# Patient Record
Sex: MALE | Race: WHITE | NOT HISPANIC OR LATINO | Employment: STUDENT | URBAN - METROPOLITAN AREA
[De-identification: names, ages, dates, MRNs, and addresses within clinical notes are randomized per-mention and may not be internally consistent; named-entity substitution may affect disease eponyms.]

---
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
## 2022-01-31 ENCOUNTER — OFFICE VISIT (OUTPATIENT)
Dept: INTERNAL MEDICINE | Facility: CLINIC | Age: 22
End: 2022-01-31
Payer: OTHER GOVERNMENT

## 2022-01-31 VITALS
TEMPERATURE: 98 F | BODY MASS INDEX: 20.12 KG/M2 | SYSTOLIC BLOOD PRESSURE: 100 MMHG | WEIGHT: 148.56 LBS | DIASTOLIC BLOOD PRESSURE: 60 MMHG | HEART RATE: 60 BPM | HEIGHT: 72 IN

## 2022-01-31 DIAGNOSIS — F41.1 GAD (GENERALIZED ANXIETY DISORDER): Primary | ICD-10-CM

## 2022-01-31 PROCEDURE — 99999 PR PBB SHADOW E&M-EST. PATIENT-LVL III: CPT | Mod: PBBFAC,,, | Performed by: INTERNAL MEDICINE

## 2022-01-31 PROCEDURE — 99213 OFFICE O/P EST LOW 20 MIN: CPT | Mod: PBBFAC,PO | Performed by: INTERNAL MEDICINE

## 2022-01-31 PROCEDURE — 99213 PR OFFICE/OUTPT VISIT, EST, LEVL III, 20-29 MIN: ICD-10-PCS | Mod: S$PBB,,, | Performed by: INTERNAL MEDICINE

## 2022-01-31 PROCEDURE — 99999 PR PBB SHADOW E&M-EST. PATIENT-LVL III: ICD-10-PCS | Mod: PBBFAC,,, | Performed by: INTERNAL MEDICINE

## 2022-01-31 PROCEDURE — 99213 OFFICE O/P EST LOW 20 MIN: CPT | Mod: S$PBB,,, | Performed by: INTERNAL MEDICINE

## 2022-01-31 RX ORDER — BUSPIRONE HYDROCHLORIDE 10 MG/1
10 TABLET ORAL 3 TIMES DAILY
Qty: 90 TABLET | Refills: 3 | Status: SHIPPED | OUTPATIENT
Start: 2022-01-31 | End: 2022-10-17 | Stop reason: SDUPTHER

## 2022-01-31 NOTE — PROGRESS NOTES
Subjective:       Patient ID: Dameon Martinez is a 21 y.o. male.    Chief Complaint: Follow-up    HPI Patient is a 21-year-old male presenting today following up on his anxiety disorder.  He indicates he has been doing well.  He has been taking the BuSpar and Lexapro without any significant issues.  He states when he got out of school for the holiday break he stop taking the abuse mcnally he felt he did need it while he was in school but now he is back in school he is getting back on the medication.  He had a good response to the combo therapy and has done well generally with it.  He did not have any adverse effects.  He has been happy with the treatment.    Review of Systems    Objective:   /60   Pulse 60   Temp 97.9 °F (36.6 °C) (Temporal)   Ht 6' (1.829 m)   Wt 67.4 kg (148 lb 9.4 oz)   BMI 20.15 kg/m²      Physical Exam  Vitals reviewed.   Constitutional:       Appearance: He is well-developed.   HENT:      Head: Normocephalic and atraumatic.      Right Ear: Tympanic membrane, ear canal and external ear normal.      Left Ear: Tympanic membrane, ear canal and external ear normal.   Eyes:      Pupils: Pupils are equal, round, and reactive to light.   Neck:      Thyroid: No thyromegaly.   Cardiovascular:      Rate and Rhythm: Normal rate and regular rhythm.      Heart sounds: Normal heart sounds. No murmur heard.  No friction rub. No gallop.    Pulmonary:      Effort: Pulmonary effort is normal.      Breath sounds: Normal breath sounds. No wheezing or rales.   Abdominal:      General: Bowel sounds are normal. There is no distension.      Palpations: Abdomen is soft.      Tenderness: There is no abdominal tenderness.   Musculoskeletal:      Cervical back: Neck supple.   Psychiatric:         Mood and Affect: Mood normal.             Assessment:       1. YASIR (generalized anxiety disorder)        Plan:   No problem-specific Assessment & Plan notes found for this encounter.    YASIR (generalized anxiety  disorder)  Comments:  Continue current regimen, stable.  Good response to buspar and lexaprol  Orders:  -     busPIRone (BUSPAR) 10 MG tablet; Take 1 tablet (10 mg total) by mouth 3 (three) times daily.  Dispense: 90 tablet; Refill: 3          Follow up in about 6 months (around 7/31/2022) for Virtual Visit.

## 2022-02-21 ENCOUNTER — OFFICE VISIT (OUTPATIENT)
Dept: URGENT CARE | Facility: CLINIC | Age: 22
End: 2022-02-21
Payer: OTHER GOVERNMENT

## 2022-02-21 VITALS
DIASTOLIC BLOOD PRESSURE: 64 MMHG | TEMPERATURE: 98 F | HEIGHT: 72 IN | SYSTOLIC BLOOD PRESSURE: 128 MMHG | RESPIRATION RATE: 20 BRPM | WEIGHT: 150 LBS | BODY MASS INDEX: 20.32 KG/M2 | OXYGEN SATURATION: 100 % | HEART RATE: 65 BPM

## 2022-02-21 DIAGNOSIS — R05.9 COUGH: ICD-10-CM

## 2022-02-21 DIAGNOSIS — J06.9 VIRAL URI WITH COUGH: Primary | ICD-10-CM

## 2022-02-21 DIAGNOSIS — F41.1 GAD (GENERALIZED ANXIETY DISORDER): ICD-10-CM

## 2022-02-21 LAB
CTP QC/QA: YES
SARS-COV-2 RDRP RESP QL NAA+PROBE: NEGATIVE

## 2022-02-21 PROCEDURE — 99213 OFFICE O/P EST LOW 20 MIN: CPT | Mod: S$GLB,,, | Performed by: PHYSICIAN ASSISTANT

## 2022-02-21 PROCEDURE — 99213 PR OFFICE/OUTPT VISIT, EST, LEVL III, 20-29 MIN: ICD-10-PCS | Mod: S$GLB,,, | Performed by: PHYSICIAN ASSISTANT

## 2022-02-21 PROCEDURE — U0002 COVID-19 LAB TEST NON-CDC: HCPCS | Mod: QW,S$GLB,, | Performed by: PHYSICIAN ASSISTANT

## 2022-02-21 PROCEDURE — U0002: ICD-10-PCS | Mod: QW,S$GLB,, | Performed by: PHYSICIAN ASSISTANT

## 2022-02-21 RX ORDER — ESCITALOPRAM OXALATE 20 MG/1
20 TABLET ORAL DAILY
Qty: 30 TABLET | Refills: 11 | Status: SHIPPED | OUTPATIENT
Start: 2022-02-21 | End: 2022-07-01 | Stop reason: SDUPTHER

## 2022-02-21 NOTE — TELEPHONE ENCOUNTER
No new care gaps identified.  Powered by [employer]. Reference number: [id number].   2/21/2022 1:09:12 PM CST

## 2022-02-21 NOTE — PROGRESS NOTES
Subjective:       Patient ID: Dameon Martinez is a 21 y.o. male.    Vitals:  height is 6' (1.829 m) and weight is 68 kg (150 lb). His temperature is 97.8 °F (36.6 °C). His blood pressure is 128/64 and his pulse is 65. His respiration is 20 and oxygen saturation is 100%.     Chief Complaint: Cough    22 yo male presents to the clinic with c/o intermittent productive cough, sneezing, congestion, runny nose, post nasal drip and intermittent mild body aches x 4 days.  He has been treating symptoms with Tylenol.  His roommate has been dealing with similar symptoms.  Patient states he has a mild throat discomfort and he denies any current headache which have been occasional and mild in the past week.  Denies any fever, chills, new exposures, allergies, or hx of asthma.  Pt is allergic to PCN.     Cough  This is a new problem. The current episode started in the past 7 days. The problem has been unchanged. The problem occurs constantly. The cough is non-productive. Associated symptoms include headaches (none at present), nasal congestion, postnasal drip and a sore throat (mild discomfort). Pertinent negatives include no chest pain, chills, ear congestion, ear pain, eye redness, fever, hemoptysis, myalgias, rash, rhinorrhea, shortness of breath, sweats, weight loss or wheezing. Treatments tried: tylenol. The treatment provided mild relief. His past medical history is significant for asthma, bronchiectasis and emphysema. There is no history of bronchitis, COPD, environmental allergies or pneumonia.   URI   Associated symptoms include congestion, coughing (productive), headaches (none at present), sneezing and a sore throat (mild discomfort). Pertinent negatives include no chest pain, ear pain, nausea, rash, rhinorrhea, sinus pain, vomiting or wheezing.       Constitution: Negative for chills and fever.   HENT: Positive for congestion, postnasal drip and sore throat (mild discomfort). Negative for ear pain, ear discharge,  sinus pain, sinus pressure and trouble swallowing.    Neck: Negative for painful lymph nodes.   Cardiovascular: Negative for chest pain.   Eyes: Negative for eye discharge, eye itching, eye pain, eye redness and photophobia.   Respiratory: Positive for cough (productive). Negative for bloody sputum, shortness of breath, wheezing and asthma.    Gastrointestinal: Negative for nausea and vomiting.   Musculoskeletal: Negative for muscle ache.   Skin: Negative for rash.   Allergic/Immunologic: Positive for sneezing. Negative for environmental allergies and asthma.   Neurological: Positive for headaches (none at present).   Hematologic/Lymphatic: Negative for swollen lymph nodes.   Psychiatric/Behavioral: Negative for confusion.       Objective:      Physical Exam   Constitutional: He is oriented to person, place, and time. He is cooperative.  Non-toxic appearance. He does not appear ill. No distress. normal  HENT:   Head: Normocephalic and atraumatic.   Ears:   Right Ear: Hearing, tympanic membrane, external ear and ear canal normal. No drainage or tenderness. Tympanic membrane is not bulging. impacted cerumen  Left Ear: Hearing, tympanic membrane, external ear and ear canal normal. No drainage or tenderness. Tympanic membrane is not bulging. impacted cerumen  Nose: Congestion present. No rhinorrhea, purulent discharge or sinus tenderness. No epistaxis.   Mouth/Throat: Uvula is midline, oropharynx is clear and moist and mucous membranes are normal. Mucous membranes are moist. Mucous membranes are not pale. He does not have dentures. No trismus in the jaw. No uvula swelling. No oropharyngeal exudate, posterior oropharyngeal erythema or tonsillar abscesses. Tonsils are 2+ on the right. Tonsils are 2+ on the left. No tonsillar exudate. Oropharynx is clear.   Eyes: Conjunctivae and lids are normal. Pupils are equal, round, and reactive to light. Right eye exhibits no discharge. Left eye exhibits no discharge. No scleral  icterus.   Neck: Neck supple.   Cardiovascular: Normal rate, regular rhythm, normal heart sounds and normal pulses.   No murmur heard.  Pulmonary/Chest: Effort normal and breath sounds normal. No stridor. No respiratory distress. He has no wheezes. He has no rhonchi. He has no rales.   Lymphadenopathy:     He has no cervical adenopathy.   Neurological: no focal deficit. He is alert and oriented to person, place, and time.   Skin: Skin is warm, dry, not diaphoretic and not pale. Capillary refill takes less than 2 seconds. jaundice  Psychiatric: His behavior is normal. Mood normal.   Nursing note and vitals reviewed.        Assessment:       1. Viral URI with cough    2. Cough          Plan:         Viral URI with cough    Cough  -     POCT COVID-19 Rapid Screening         COVID test is negative.  Take Tylenol/Ibuprofen as needed for body aches and headache  Practice good hand washing  Add daily antihistamine such as Zyrtex or Claritin  Use OTC flonase 2 pumps in each nostril to relieve congestion  Try lozanges for throat pain   Follow up with primary care physician if symptoms persist in 1 week.  Return to clinic with new or worsening symptoms.        No

## 2022-02-21 NOTE — LETTER
February 21, 2022      [facility]  [address]   [place] [address]  Phone: [phone]       Patient: Dameon Martinez   YOB: 2000  Date of Visit: 02/21/2022    To Whom It May Concern:    Mary Martinez  was at [facility] on 02/21/2022. The patient may return to work on 2/22/2022 with no restrictions. If you have any questions or concerns, or if I can be of further assistance, please do not hesitate to contact me.    Sincerely,    Víctor Munoz PA-C

## 2022-02-24 ENCOUNTER — TELEPHONE (OUTPATIENT)
Dept: URGENT CARE | Facility: CLINIC | Age: 22
End: 2022-02-24
Payer: OTHER GOVERNMENT

## 2022-03-22 ENCOUNTER — PATIENT MESSAGE (OUTPATIENT)
Dept: INTERNAL MEDICINE | Facility: CLINIC | Age: 22
End: 2022-03-22
Payer: OTHER GOVERNMENT

## 2022-03-22 DIAGNOSIS — Z20.828 EXPOSURE TO MONONUCLEOSIS SYNDROME: Primary | ICD-10-CM

## 2022-03-23 ENCOUNTER — PATIENT MESSAGE (OUTPATIENT)
Dept: RESEARCH | Facility: HOSPITAL | Age: 22
End: 2022-03-23
Payer: OTHER GOVERNMENT

## 2022-03-23 ENCOUNTER — LAB VISIT (OUTPATIENT)
Dept: LAB | Facility: HOSPITAL | Age: 22
End: 2022-03-23
Attending: INTERNAL MEDICINE
Payer: OTHER GOVERNMENT

## 2022-03-23 DIAGNOSIS — Z20.828 EXPOSURE TO MONONUCLEOSIS SYNDROME: ICD-10-CM

## 2022-03-23 LAB — HETEROPH AB SERPL QL IA: NEGATIVE

## 2022-03-23 PROCEDURE — 86308 HETEROPHILE ANTIBODY SCREEN: CPT | Performed by: INTERNAL MEDICINE

## 2022-03-23 PROCEDURE — 36415 COLL VENOUS BLD VENIPUNCTURE: CPT | Mod: PO | Performed by: INTERNAL MEDICINE

## 2022-03-24 ENCOUNTER — OFFICE VISIT (OUTPATIENT)
Dept: URGENT CARE | Facility: CLINIC | Age: 22
End: 2022-03-24
Payer: OTHER GOVERNMENT

## 2022-03-24 VITALS
DIASTOLIC BLOOD PRESSURE: 59 MMHG | SYSTOLIC BLOOD PRESSURE: 117 MMHG | WEIGHT: 150 LBS | HEIGHT: 72 IN | BODY MASS INDEX: 20.32 KG/M2 | HEART RATE: 68 BPM | OXYGEN SATURATION: 98 % | TEMPERATURE: 99 F | RESPIRATION RATE: 18 BRPM

## 2022-03-24 DIAGNOSIS — Z20.828 EXPOSURE TO MONONUCLEOSIS SYNDROME: ICD-10-CM

## 2022-03-24 DIAGNOSIS — J02.9 PHARYNGITIS, UNSPECIFIED ETIOLOGY: ICD-10-CM

## 2022-03-24 DIAGNOSIS — J02.9 SORE THROAT: Primary | ICD-10-CM

## 2022-03-24 LAB
CTP QC/QA: YES
CTP QC/QA: YES
HETEROPH AB SER QL: NEGATIVE
MOLECULAR STREP A: NEGATIVE

## 2022-03-24 PROCEDURE — 86665 EPSTEIN-BARR CAPSID VCA: CPT | Performed by: INTERNAL MEDICINE

## 2022-03-24 PROCEDURE — 99214 OFFICE O/P EST MOD 30 MIN: CPT | Mod: S$GLB,,, | Performed by: INTERNAL MEDICINE

## 2022-03-24 PROCEDURE — 99214 PR OFFICE/OUTPT VISIT, EST, LEVL IV, 30-39 MIN: ICD-10-PCS | Mod: S$GLB,,, | Performed by: INTERNAL MEDICINE

## 2022-03-24 PROCEDURE — 86665 EPSTEIN-BARR CAPSID VCA: CPT | Mod: 59 | Performed by: INTERNAL MEDICINE

## 2022-03-24 PROCEDURE — 86308 POCT INFECTIOUS MONONUCLEOSIS: ICD-10-PCS | Mod: QW,S$GLB,, | Performed by: INTERNAL MEDICINE

## 2022-03-24 PROCEDURE — 87651 STREP A DNA AMP PROBE: CPT | Mod: QW,S$GLB,, | Performed by: INTERNAL MEDICINE

## 2022-03-24 PROCEDURE — 87651 POCT STREP A MOLECULAR: ICD-10-PCS | Mod: QW,S$GLB,, | Performed by: INTERNAL MEDICINE

## 2022-03-24 PROCEDURE — 86308 HETEROPHILE ANTIBODY SCREEN: CPT | Mod: QW,S$GLB,, | Performed by: INTERNAL MEDICINE

## 2022-03-24 RX ORDER — PREDNISONE 20 MG/1
40 TABLET ORAL
Status: COMPLETED | OUTPATIENT
Start: 2022-03-24 | End: 2022-03-24

## 2022-03-24 RX ADMIN — PREDNISONE 40 MG: 20 TABLET ORAL at 07:03

## 2022-03-24 NOTE — PROGRESS NOTES
Subjective:       Patient ID: Dameon Martinez is a 21 y.o. male.    Vitals:  height is 6' (1.829 m) and weight is 68 kg (150 lb). His oral temperature is 98.5 °F (36.9 °C). His blood pressure is 117/59 (abnormal) and his pulse is 68. His respiration is 18 and oxygen saturation is 98%.     Chief Complaint: Sore Throat    Pt presents to the clinic today with sore throat that began about a week and a half ago. Pt states that he has been exposed to mono.    Sore Throat   This is a new problem. The current episode started 1 to 4 weeks ago (one and a half weeks). The problem has been gradually worsening. There has been no fever. The pain is at a severity of 6/10. The pain is moderate. Pertinent negatives include no congestion, coughing or headaches. He has had exposure to mono. He has had no exposure to strep. He has tried NSAIDs for the symptoms. The treatment provided mild relief.       HENT: Positive for sore throat. Negative for congestion.    Respiratory: Negative for cough.    Skin: Negative for erythema.   Neurological: Negative for headaches.       Objective:      Physical Exam   Constitutional: He is oriented to person, place, and time. He appears well-developed. No distress.   HENT:   Head: Normocephalic and atraumatic.   Ears:   Right Ear: External ear normal.   Left Ear: External ear normal.   Nose: Nose normal.   Mouth/Throat: Posterior oropharyngeal erythema present. No oropharyngeal exudate.      Comments: Cervical LAD  Eyes: Conjunctivae and EOM are normal. Pupils are equal, round, and reactive to light. Right eye exhibits no discharge. Left eye exhibits no discharge. No scleral icterus.   Neck: Neck supple.   Cardiovascular: Normal rate, regular rhythm and normal heart sounds.   No murmur heard.Exam reveals no gallop and no friction rub.   Pulmonary/Chest: Effort normal. No respiratory distress. He has no wheezes. He has no rales.   Abdominal: Bowel sounds are normal. He exhibits no distension. Soft.    Lymphadenopathy:     He has no cervical adenopathy.   Neurological: He is alert and oriented to person, place, and time.   Skin: Skin is warm, dry, not diaphoretic and no rash. Capillary refill takes less than 2 seconds. No erythema   Psychiatric: His behavior is normal.   Nursing note and vitals reviewed.        Assessment:       1. Sore throat    2. Exposure to mononucleosis syndrome    3. Pharyngitis, unspecified etiology          Plan:         Sore throat  -     POCT Infectious mononucleosis antibody  -     POCT Strep A, Molecular  -     predniSONE tablet 40 mg  -     (Magic mouthwash) 1:1:1 diphenhydramine(Benadryl) 12.5mg/5ml liq, aluminum & magnesium hydroxide-simethicone (Maalox), LIDOcaine viscous 2%; Swish and spit 10 mLs every 4 (four) hours as needed. for mouth sores  Dispense: 360 mL; Refill: 0    Exposure to mononucleosis syndrome  -     Isai-Barr Virus (EBV) Antibodies to VCA, IgM  -     Isai-Barr Virus (EBV) Antibodies to VCA, IgG    Pharyngitis, unspecified etiology  -     predniSONE tablet 40 mg  -     (Magic mouthwash) 1:1:1 diphenhydramine(Benadryl) 12.5mg/5ml liq, aluminum & magnesium hydroxide-simethicone (Maalox), LIDOcaine viscous 2%; Swish and spit 10 mLs every 4 (four) hours as needed. for mouth sores  Dispense: 360 mL; Refill: 0

## 2022-03-24 NOTE — LETTER
March 24, 2022      [facility]  [address]   [place] [address]  Phone: [phone]       Patient: Dameon Martinez   YOB: 2000  Date of Visit: 03/24/2022    To Whom It May Concern:    Mary Martinez  was at [facility] on 03/24/2022. The patient may return to work/school on 3/28/2022  with no restrictions provided he has improved symptoms and no fever for 24 hours. If you have any questions or concerns, or if I can be of further assistance, please do not hesitate to contact me.    Sincerely,    Nhan Du MD

## 2022-03-27 ENCOUNTER — TELEPHONE (OUTPATIENT)
Dept: URGENT CARE | Facility: CLINIC | Age: 22
End: 2022-03-27
Payer: OTHER GOVERNMENT

## 2022-03-28 LAB
EBV VCA IGG SER QL IA: POSITIVE
EBV VCA IGM SER QL IA: NEGATIVE

## 2022-03-29 ENCOUNTER — TELEPHONE (OUTPATIENT)
Dept: URGENT CARE | Facility: CLINIC | Age: 22
End: 2022-03-29
Payer: OTHER GOVERNMENT

## 2022-03-29 NOTE — TELEPHONE ENCOUNTER
Attempted to contact patient to discuss recent test results. BLANCA. Per chart review, pt was contacted for courtesy call and was feeling better.

## 2022-03-29 NOTE — TELEPHONE ENCOUNTER
Pt called back clinic. Discussed + EBV IgG. Pt still having some sore throat and fatigue, but slightly better from visit. Using magic mouthwash prn. Discussed viral etiology and to f/u with PCP if symptoms are persistent. Denies any abdominal pain or fever. Avoid contact sports while symptomatic.

## 2022-05-29 ENCOUNTER — OFFICE VISIT (OUTPATIENT)
Dept: URGENT CARE | Facility: CLINIC | Age: 22
End: 2022-05-29
Payer: OTHER GOVERNMENT

## 2022-05-29 VITALS
TEMPERATURE: 99 F | HEART RATE: 75 BPM | OXYGEN SATURATION: 98 % | WEIGHT: 150 LBS | RESPIRATION RATE: 20 BRPM | HEIGHT: 72 IN | BODY MASS INDEX: 20.32 KG/M2

## 2022-05-29 DIAGNOSIS — J06.9 VIRAL URI: Primary | ICD-10-CM

## 2022-05-29 DIAGNOSIS — R05.9 COUGH: ICD-10-CM

## 2022-05-29 DIAGNOSIS — R50.9 FEVER, UNSPECIFIED FEVER CAUSE: ICD-10-CM

## 2022-05-29 LAB
CTP QC/QA: YES
CTP QC/QA: YES
POC MOLECULAR INFLUENZA A AGN: NEGATIVE
POC MOLECULAR INFLUENZA B AGN: NEGATIVE
SARS-COV-2 RDRP RESP QL NAA+PROBE: NEGATIVE

## 2022-05-29 PROCEDURE — U0002 COVID-19 LAB TEST NON-CDC: HCPCS | Mod: QW,S$GLB,, | Performed by: PHYSICIAN ASSISTANT

## 2022-05-29 PROCEDURE — 99214 OFFICE O/P EST MOD 30 MIN: CPT | Mod: S$GLB,,, | Performed by: PHYSICIAN ASSISTANT

## 2022-05-29 PROCEDURE — 87502 INFLUENZA DNA AMP PROBE: CPT | Mod: QW,S$GLB,, | Performed by: PHYSICIAN ASSISTANT

## 2022-05-29 PROCEDURE — U0002: ICD-10-PCS | Mod: QW,S$GLB,, | Performed by: PHYSICIAN ASSISTANT

## 2022-05-29 PROCEDURE — 99214 PR OFFICE/OUTPT VISIT, EST, LEVL IV, 30-39 MIN: ICD-10-PCS | Mod: S$GLB,,, | Performed by: PHYSICIAN ASSISTANT

## 2022-05-29 PROCEDURE — 87502 POCT INFLUENZA A/B MOLECULAR: ICD-10-PCS | Mod: QW,S$GLB,, | Performed by: PHYSICIAN ASSISTANT

## 2022-05-29 RX ORDER — BROMPHENIRAMINE MALEATE, PSEUDOEPHEDRINE HYDROCHLORIDE, AND DEXTROMETHORPHAN HYDROBROMIDE 2; 30; 10 MG/5ML; MG/5ML; MG/5ML
10 SYRUP ORAL EVERY 4 HOURS PRN
Qty: 118 ML | Refills: 0 | Status: SHIPPED | OUTPATIENT
Start: 2022-05-29 | End: 2022-06-05

## 2022-05-29 NOTE — PROGRESS NOTES
Subjective:       Patient ID: Dameon Martinez is a 21 y.o. male.    Vitals:  height is 6' (1.829 m) and weight is 68 kg (150 lb). His temperature is 99.4 °F (37.4 °C). His pulse is 75. His respiration is 20 and oxygen saturation is 98%.     Chief Complaint: Cough    Pt present for sore throat, cough, and runny nose that started 3 days ago and has gotten worse. Pt states he took nyquil and tylenol but it provided no relief.  Patient states that he has felt feverish but has not taken his temperature. Main complaint is cough. Pt reports worse at night.     Cough  This is a new problem. The current episode started in the past 7 days. The problem has been gradually worsening. The problem occurs constantly. The cough is productive of sputum. Associated symptoms include chills, ear pain, a fever, headaches, nasal congestion, postnasal drip, rhinorrhea, a sore throat and sweats. Pertinent negatives include no chest pain, ear congestion, heartburn, hemoptysis, myalgias, rash, shortness of breath, weight loss or wheezing. Nothing aggravates the symptoms. Treatments tried: tylenol, nyquil. The treatment provided no relief.       Constitution: Positive for chills and fever.   HENT: Positive for ear pain, congestion, postnasal drip and sore throat.    Cardiovascular: Negative for chest pain.   Respiratory: Positive for cough. Negative for chest tightness, bloody sputum, shortness of breath and wheezing.    Gastrointestinal: Negative for heartburn.   Musculoskeletal: Negative for muscle ache.   Skin: Negative for rash.   Neurological: Positive for headaches.       Objective:      Physical Exam   Constitutional: He appears well-developed.  Non-toxic appearance. He does not appear ill. No distress.   HENT:   Head: Normocephalic and atraumatic.   Ears:   Right Ear: Tympanic membrane, external ear and ear canal normal.   Left Ear: Tympanic membrane, external ear and ear canal normal.   Nose: Nose normal.   Mouth/Throat: Uvula is  midline. Mucous membranes are moist. Posterior oropharyngeal erythema present. No oropharyngeal exudate. Tonsils are 2+ on the right. Tonsils are 2+ on the left. No tonsillar exudate.   Eyes: Conjunctivae and EOM are normal.   Neck: Neck supple.   Pulmonary/Chest: Effort normal and breath sounds normal.   Abdominal: Normal appearance.   Musculoskeletal: Normal range of motion.         General: Normal range of motion.   Lymphadenopathy:     He has no cervical adenopathy.   Neurological: no focal deficit. He is alert. He displays no weakness. Gait normal.   Skin: Skin is warm, dry, not diaphoretic, not pale and no rash.   Psychiatric: His behavior is normal.     Results for orders placed or performed in visit on 05/29/22   POCT COVID-19 Rapid Screening   Result Value Ref Range    POC Rapid COVID Negative Negative     Acceptable Yes    POCT Influenza A/B Molecular   Result Value Ref Range    POC Molecular Influenza A Ag Negative Negative, Not Reported    POC Molecular Influenza B Ag Negative Negative, Not Reported     Acceptable Yes            Assessment:       1. Viral URI    2. Fever, unspecified fever cause    3. Cough          Plan:       COVID & flu negative.  Bromfed prn for cough/congestion. Tylenol or advil as needed for fever.  Rest and drink plenty of fluids.  Patient can return to clinic or follow up with PCP if symptoms worsen or fail to improve. Pt voiced understanding and all questions were answered prior to discharge.     Viral URI    Fever, unspecified fever cause  -     POCT COVID-19 Rapid Screening  -     POCT Influenza A/B Molecular    Cough  -     brompheniramine-pseudoeph-DM (BROMFED DM) 2-30-10 mg/5 mL Syrp; Take 10 mLs by mouth every 4 (four) hours as needed (cough).  Dispense: 118 mL; Refill: 0

## 2022-05-29 NOTE — LETTER
[address] [facility] [address]  ? [place], [address] ? Phone [phone] ? Fax             Return to Work/School    Patient: Dameon Martinez  YOB: 2000   Date: 05/29/2022      To Whom It May Concern:     Dameon Martinez was in contact with/seen in my office on 05/29/2022. COVID-19 is present in our communities across the state. Not all patients are eligible or appropriate to be tested. In this situation, your employee meets the following criteria:     Dameon Martinez has met the criteria for COVID-19 testing and has a NEGATIVE result. The employee can return to work once they are fever free for 24 hours without the use of fever reducing medications (Tylenol, Motrin, etc).     If you have any questions or concerns, or if I can be of further assistance, please do not hesitate to contact me.     Sincerely,    Tete Miller PA-C

## 2022-06-01 ENCOUNTER — TELEPHONE (OUTPATIENT)
Dept: URGENT CARE | Facility: CLINIC | Age: 22
End: 2022-06-01
Payer: OTHER GOVERNMENT

## 2022-07-13 ENCOUNTER — OFFICE VISIT (OUTPATIENT)
Dept: URGENT CARE | Facility: CLINIC | Age: 22
End: 2022-07-13
Payer: OTHER GOVERNMENT

## 2022-07-13 VITALS
SYSTOLIC BLOOD PRESSURE: 117 MMHG | BODY MASS INDEX: 20.32 KG/M2 | RESPIRATION RATE: 12 BRPM | TEMPERATURE: 98 F | DIASTOLIC BLOOD PRESSURE: 58 MMHG | HEART RATE: 65 BPM | WEIGHT: 150 LBS | HEIGHT: 72 IN | OXYGEN SATURATION: 97 %

## 2022-07-13 DIAGNOSIS — M79.671 ACUTE FOOT PAIN, RIGHT: Primary | ICD-10-CM

## 2022-07-13 PROCEDURE — 73630 XR FOOT COMPLETE 3 VIEW RIGHT: ICD-10-PCS | Mod: AQ,RT,S$GLB, | Performed by: RADIOLOGY

## 2022-07-13 PROCEDURE — 73630 X-RAY EXAM OF FOOT: CPT | Mod: AQ,RT,S$GLB, | Performed by: RADIOLOGY

## 2022-07-13 PROCEDURE — 99214 OFFICE O/P EST MOD 30 MIN: CPT | Mod: S$GLB,,, | Performed by: NURSE PRACTITIONER

## 2022-07-13 PROCEDURE — 99214 PR OFFICE/OUTPT VISIT, EST, LEVL IV, 30-39 MIN: ICD-10-PCS | Mod: S$GLB,,, | Performed by: NURSE PRACTITIONER

## 2022-07-13 NOTE — LETTER
July 13, 2022      [facility]  [address] [place] [address] [place] [address]  Phone: [phone]  Fax: [phone]       Patient: Dameon Martinez   YOB: 2000  Date of Visit: 07/13/2022    To Whom It May Concern:    Mary Martinez  was at [facility] on 07/13/2022. The patient may return to work/school on 07/16/2022 restrictions. If you have any questions or concerns, or if I can be of further assistance, please do not hesitate to contact me.    Sincerely,    Mily Chan, RT

## 2022-07-13 NOTE — PROGRESS NOTES
Subjective:       Patient ID: Dameon Martinez is a 21 y.o. male.    Vitals:  height is 6' (1.829 m) and weight is 68 kg (150 lb).     Chief Complaint: Toe Injury    Toe Injury  This is a new problem. The current episode started yesterday (Injury occured last night around 9pm). The problem occurs constantly. The problem has been unchanged. Pertinent negatives include no abdominal pain, change in bowel habit, chest pain, chills, congestion, coughing, fatigue, fever, headaches, myalgias, nausea, sore throat or swollen glands. The symptoms are aggravated by walking and standing. He has tried NSAIDs for the symptoms. The treatment provided no relief.       Constitution: Negative for chills, fatigue and fever.   HENT: Negative for congestion and sore throat.    Cardiovascular: Negative for chest pain.   Respiratory: Negative for cough.    Gastrointestinal: Negative for abdominal pain and nausea.   Musculoskeletal: Negative for muscle ache.   Neurological: Negative for headaches.           Past Medical History:   Diagnosis Date    Anxiety     Patient denies medical problems           Social History     Socioeconomic History    Marital status: Single   Tobacco Use    Smoking status: Never Smoker    Smokeless tobacco: Never Used   Substance and Sexual Activity    Alcohol use: Yes     Alcohol/week: 1.0 standard drink     Types: 1 Cans of beer per week    Drug use: Yes     Frequency: 5.0 times per week     Types: Marijuana    Sexual activity: Yes        History reviewed. No pertinent family history.    ROS:  GENERAL: No fever, chills, fatigability or weight loss.  SKIN: No rashes, itching or changes in color or texture of skin.  HEENT: No headaches or recent head trauma. Visual acuity fine. No photophobia, ocular pain or diplopia. Denies ear pain, discharge or vertigo. No loss of smell, no epistaxis or postnasal drip. No hoarseness or change in voice.   NODES: No masses or lesions. Denies swollen glands.  CHEST: Denies  cyanosis, wheezing, cough and sputum production.  CARDIOVASCULAR: Denies chest pain, PND, orthopnea or reduced exercise tolerance.  ABDOMEN: Appetite fine. No weight loss. Denies diarrhea, abdominal pain, hematemesis or blood in stool.  MUSCULOSKELETAL: right foot/ fifth toe pain  NEUROLOGIC: No history of seizures, paralysis, alteration of gait or coordination.  PSYCHIATRIC: Denies mood swings, depression or suicidal thoughts.    PE:   APPEARANCE: Well nourished, well developed, in mild distress.  Temp 97.7°, pulse ox 97%  V/S: Reviewed.  SKIN:  Right 5 th toe with soft tissue swelling and ecchymosis  CHEST:  No respiratory symptoms  CARDIOVASCULAR: Regular rate and rhythm .  MUSCULOSKELETAL: Right 5th toe with soft tissue swelling and ecchymosis, limited range of motion due to pain  NEUROLOGIC: No sensory deficits. Gait & Posture: Normal gait and fine motion. No cerebellar signs.  MENTAL STATUS: Patient alert, oriented x 3 & conversant.    PLAN:  X-ray right foot  Advise elevate and apply cool compress  Advise senait tape fifth toe  Advise increase p.o. fluids--water/juice & rest  Tylenol or Ibuprofen for fever, headache and body aches.  Advise follow up with PCP in 2- 3 days for recheck  Advise go to ER if nausea, vomiting, fever, increased pain, or fail to improve with treatment.  AVS provided and reviewed with patient including supportive care, follow up, and red flag symptoms.   Patient verbalizes understanding and agrees with treatment plan. Discharged from Urgent Care in stable condition.    Advise x-rays [facility] radiologist will officially read x-rays, staff will telephone results    DIAGNOSIS:  Right foot/ fifth toe pain

## 2022-07-13 NOTE — PATIENT INSTRUCTIONS
PLAN:  X-ray right foot  Advise elevate and apply cool compress  Advise senait tape fifth toe  Advise increase p.o. fluids--water/juice & rest  Tylenol or Ibuprofen for fever, headache and body aches.  Advise follow up with PCP in 2- 3 days for recheck  Advise go to ER if nausea, vomiting, fever, increased pain, or fail to improve with treatment.  AVS provided and reviewed with patient including supportive care, follow up, and red flag symptoms.   Patient verbalizes understanding and agrees with treatment plan. Discharged from Urgent Care in stable condition.

## 2022-07-18 ENCOUNTER — TELEPHONE (OUTPATIENT)
Dept: URGENT CARE | Facility: CLINIC | Age: 22
End: 2022-07-18
Payer: OTHER GOVERNMENT

## 2022-10-17 DIAGNOSIS — F41.1 GAD (GENERALIZED ANXIETY DISORDER): ICD-10-CM

## 2022-10-17 RX ORDER — ESCITALOPRAM OXALATE 20 MG/1
20 TABLET ORAL DAILY
Qty: 30 TABLET | Refills: 1 | Status: SHIPPED | OUTPATIENT
Start: 2022-10-17 | End: 2022-12-21 | Stop reason: SDUPTHER

## 2022-10-17 RX ORDER — BUSPIRONE HYDROCHLORIDE 10 MG/1
10 TABLET ORAL 3 TIMES DAILY
Qty: 90 TABLET | Refills: 1 | Status: SHIPPED | OUTPATIENT
Start: 2022-10-17 | End: 2023-02-01 | Stop reason: SDUPTHER

## 2022-10-17 NOTE — TELEPHONE ENCOUNTER
No new care gaps identified.  [facility] Embedded Care Gaps. Reference number: [id number]. 10/17/2022   1:40:14 PM CDT

## 2022-10-19 ENCOUNTER — OFFICE VISIT (OUTPATIENT)
Dept: URGENT CARE | Facility: CLINIC | Age: 22
End: 2022-10-19
Payer: OTHER GOVERNMENT

## 2022-10-19 VITALS
HEART RATE: 71 BPM | TEMPERATURE: 98 F | SYSTOLIC BLOOD PRESSURE: 125 MMHG | HEIGHT: 72 IN | RESPIRATION RATE: 18 BRPM | WEIGHT: 150 LBS | OXYGEN SATURATION: 95 % | DIASTOLIC BLOOD PRESSURE: 60 MMHG | BODY MASS INDEX: 20.32 KG/M2

## 2022-10-19 DIAGNOSIS — J06.9 VIRAL URI: Primary | ICD-10-CM

## 2022-10-19 DIAGNOSIS — R09.81 NASAL CONGESTION: ICD-10-CM

## 2022-10-19 DIAGNOSIS — R05.9 COUGH, UNSPECIFIED TYPE: ICD-10-CM

## 2022-10-19 PROCEDURE — 87502 POCT INFLUENZA A/B MOLECULAR: ICD-10-PCS | Mod: QW,S$GLB,, | Performed by: PHYSICIAN ASSISTANT

## 2022-10-19 PROCEDURE — 99214 PR OFFICE/OUTPT VISIT, EST, LEVL IV, 30-39 MIN: ICD-10-PCS | Mod: S$GLB,,, | Performed by: PHYSICIAN ASSISTANT

## 2022-10-19 PROCEDURE — 99214 OFFICE O/P EST MOD 30 MIN: CPT | Mod: S$GLB,,, | Performed by: PHYSICIAN ASSISTANT

## 2022-10-19 PROCEDURE — 87635 SARS-COV-2 COVID-19 AMP PRB: CPT | Mod: QW,S$GLB,, | Performed by: PHYSICIAN ASSISTANT

## 2022-10-19 PROCEDURE — 87635: ICD-10-PCS | Mod: QW,S$GLB,, | Performed by: PHYSICIAN ASSISTANT

## 2022-10-19 PROCEDURE — 87502 INFLUENZA DNA AMP PROBE: CPT | Mod: QW,S$GLB,, | Performed by: PHYSICIAN ASSISTANT

## 2022-10-19 RX ORDER — GUAIFENESIN AND DEXTROMETHORPHAN HYDROBROMIDE 600; 30 MG/1; MG/1
1 TABLET, EXTENDED RELEASE ORAL 2 TIMES DAILY PRN
Qty: 20 TABLET | Refills: 0 | Status: SHIPPED | OUTPATIENT
Start: 2022-10-19 | End: 2023-02-07

## 2022-10-19 RX ORDER — FLUTICASONE PROPIONATE 50 MCG
1 SPRAY, SUSPENSION (ML) NASAL DAILY
Qty: 16 ML | Refills: 0 | Status: SHIPPED | OUTPATIENT
Start: 2022-10-19 | End: 2023-02-07

## 2022-10-19 RX ORDER — PREDNISONE 20 MG/1
TABLET ORAL
Qty: 7 TABLET | Refills: 0 | Status: SHIPPED | OUTPATIENT
Start: 2022-10-19 | End: 2023-01-10

## 2022-10-19 NOTE — PROGRESS NOTES
Subjective:       Patient ID: Dameon Martinez is a 21 y.o. male.    Vitals:  height is 6' (1.829 m) and weight is 68 kg (150 lb). His temperature is 98.2 °F (36.8 °C). His blood pressure is 125/60 and his pulse is 71. His respiration is 18 and oxygen saturation is 95%.     Chief Complaint: Cough    Pt present for cough & congestion that started 2 days ago. Pt states he has body aches- felt feverish but didn't take temperature. States he got back from a beach trip 3 days ago and most of people that were on the trip are having similar symptoms.     Cough  This is a new problem. Episode onset: 2 days ago. The problem has been gradually worsening. The problem occurs constantly. The cough is Productive of sputum. Associated symptoms include chills, ear pain, headaches, myalgias, nasal congestion, postnasal drip, rhinorrhea and sweats. Pertinent negatives include no chest pain, ear congestion, fever, heartburn, hemoptysis, rash, sore throat, shortness of breath, weight loss or wheezing. Nothing aggravates the symptoms. Treatments tried: tylenol. The treatment provided no relief.     Constitution: Positive for chills. Negative for fever.   HENT:  Positive for ear pain and postnasal drip. Negative for sore throat.    Cardiovascular:  Negative for chest pain.   Respiratory:  Positive for cough. Negative for bloody sputum, shortness of breath and wheezing.    Gastrointestinal:  Negative for heartburn.   Musculoskeletal:  Positive for muscle ache.   Skin:  Negative for rash.   Neurological:  Positive for headaches.     Objective:      Physical Exam   Constitutional: He appears well-developed.  Non-toxic appearance. He does not appear ill. No distress.   HENT:   Head: Normocephalic and atraumatic.   Ears:   Right Ear: Tympanic membrane, external ear and ear canal normal.   Left Ear: Tympanic membrane, external ear and ear canal normal.   Nose: Congestion present.   Mouth/Throat: Uvula is midline and oropharynx is clear and  moist. Mucous membranes are moist. No oropharyngeal exudate or posterior oropharyngeal erythema. Tonsils are 1+ on the right. Tonsils are 1+ on the left. No tonsillar exudate. Oropharynx is clear.   Eyes: Conjunctivae and EOM are normal.   Neck: Neck supple.   Pulmonary/Chest: Effort normal and breath sounds normal.   Abdominal: Normal appearance.   Musculoskeletal: Normal range of motion.         General: Normal range of motion.   Neurological: no focal deficit. He is alert. He displays no weakness. Gait normal.   Skin: Skin is warm, dry, not diaphoretic, not pale and no rash.   Psychiatric: His behavior is normal.       Results for orders placed or performed in visit on 10/19/22   POCT COVID-19 Rapid Screening   Result Value Ref Range    POC Rapid COVID Negative Negative     Acceptable Yes    POCT Influenza A/B MOLECULAR   Result Value Ref Range    POC Molecular Influenza A Ag Negative Negative, Not Reported    POC Molecular Influenza B Ag Negative Negative, Not Reported     Acceptable Yes        Assessment:       1. Viral URI    2. Cough, unspecified type    3. Nasal congestion          Plan:       COVID & flu negative.  Recommend OTC medications as needed for cold symptoms- flonase & mucinex sent it. Optional prednisone taper.  Tylenol as needed for fever. Stay home until fever free for 24 hours w/o meds. Rest and drink plenty of fluids.  Patient can return to clinic or follow up with PCP if symptoms worsen or fail to improve. Pt voiced understanding and all questions were answered prior to discharge.     Viral URI  -     dextromethorphan-guaiFENesin  mg (MUCINEX DM)  mg per 12 hr tablet; Take 1 tablet by mouth 2 (two) times daily as needed (cough/congestion).  Dispense: 20 tablet; Refill: 0  -     fluticasone propionate (FLONASE) 50 mcg/actuation nasal spray; 1 spray (50 mcg total) by Each Nostril route once daily.  Dispense: 16 mL; Refill: 0  -     predniSONE  (DELTASONE) 20 MG tablet; Take 1 tablet twice daily for 2 days. Then take 1 tablet daily for 3 days.  Dispense: 7 tablet; Refill: 0    Cough, unspecified type  -     POCT COVID-19 Rapid Screening  -     POCT Influenza A/B MOLECULAR  -     dextromethorphan-guaiFENesin  mg (MUCINEX DM)  mg per 12 hr tablet; Take 1 tablet by mouth 2 (two) times daily as needed (cough/congestion).  Dispense: 20 tablet; Refill: 0  -     predniSONE (DELTASONE) 20 MG tablet; Take 1 tablet twice daily for 2 days. Then take 1 tablet daily for 3 days.  Dispense: 7 tablet; Refill: 0    Nasal congestion  -     POCT COVID-19 Rapid Screening  -     POCT Influenza A/B MOLECULAR  -     dextromethorphan-guaiFENesin  mg (MUCINEX DM)  mg per 12 hr tablet; Take 1 tablet by mouth 2 (two) times daily as needed (cough/congestion).  Dispense: 20 tablet; Refill: 0  -     fluticasone propionate (FLONASE) 50 mcg/actuation nasal spray; 1 spray (50 mcg total) by Each Nostril route once daily.  Dispense: 16 mL; Refill: 0  -     predniSONE (DELTASONE) 20 MG tablet; Take 1 tablet twice daily for 2 days. Then take 1 tablet daily for 3 days.  Dispense: 7 tablet; Refill: 0

## 2022-10-19 NOTE — LETTER
[address] [facility] [address] , [address] ? [place], [address] ? Phone [phone] ? Fax [phone]           Return to Work/School    Patient: Dameon Martinez  YOB: 2000   Date: 10/19/2022      To Whom It May Concern:     Dameon Martinez was in contact with/seen in my office on 10/19/2022. COVID-19 is present in our communities across the [facility]. Not all patients are eligible or appropriate to be tested. In this situation, he meets the following criteria:     Dameon Martinez has met the criteria for COVID-19 testing and has a NEGATIVE result. The patient can return to work/school once they are fever free for 24 hours without the use of fever reducing medications (Tylenol, Motrin, etc).     If you have any questions or concerns, or if I can be of further assistance, please do not hesitate to contact me.     Sincerely,    Tete Miller PA-C

## 2022-10-22 ENCOUNTER — TELEPHONE (OUTPATIENT)
Dept: URGENT CARE | Facility: CLINIC | Age: 22
End: 2022-10-22
Payer: OTHER GOVERNMENT

## 2022-10-22 NOTE — TELEPHONE ENCOUNTER
Courtesy call to pt to see how he was feeling. Pt states he is not feeling better. Pt was offered a reevaluation.

## 2023-01-02 ENCOUNTER — PATIENT MESSAGE (OUTPATIENT)
Dept: INTERNAL MEDICINE | Facility: CLINIC | Age: 23
End: 2023-01-02
Payer: OTHER GOVERNMENT

## 2023-01-02 DIAGNOSIS — J35.1 ENLARGED TONSILS: Primary | ICD-10-CM

## 2023-01-03 NOTE — TELEPHONE ENCOUNTER
Referral placed.    Orders Placed This Encounter   Procedures    Ambulatory referral/consult to ENT     Standing Status:   Future     Standing Expiration Date:   2/3/2024     Referral Priority:   Routine     Referral Type:   Consultation     Referral Reason:   Specialty Services Required     Requested Specialty:   Otolaryngology     Number of Visits Requested:   1

## 2023-01-06 ENCOUNTER — TELEPHONE (OUTPATIENT)
Dept: OTOLARYNGOLOGY | Facility: CLINIC | Age: 23
End: 2023-01-06
Payer: OTHER GOVERNMENT

## 2023-01-06 NOTE — TELEPHONE ENCOUNTER
----- Message from Fortunato Ramires sent at 1/6/2023  3:53 PM CST -----  Regarding: Referral Appointment  Contact: Dameon  Ino is calling to schedule his appointment with ENT from referral. Please callback at [phone]        Thanks,  AK

## 2023-01-06 NOTE — TELEPHONE ENCOUNTER
Attempted to contact pt.  No answer, left detailed message with appt details.  Requested recall if appt does not work for him.

## 2023-01-10 ENCOUNTER — OFFICE VISIT (OUTPATIENT)
Dept: OTOLARYNGOLOGY | Facility: CLINIC | Age: 23
End: 2023-01-10
Payer: OTHER GOVERNMENT

## 2023-01-10 VITALS — BODY MASS INDEX: 21.62 KG/M2 | TEMPERATURE: 98 F | WEIGHT: 159.38 LBS

## 2023-01-10 DIAGNOSIS — J35.1 ENLARGED TONSILS: ICD-10-CM

## 2023-01-10 DIAGNOSIS — J35.01 CHRONIC TONSILLITIS: Primary | ICD-10-CM

## 2023-01-10 DIAGNOSIS — J35.1 TONSILLAR HYPERTROPHY: ICD-10-CM

## 2023-01-10 PROCEDURE — 99213 OFFICE O/P EST LOW 20 MIN: CPT | Mod: PBBFAC,PO | Performed by: STUDENT IN AN ORGANIZED HEALTH CARE EDUCATION/TRAINING PROGRAM

## 2023-01-10 PROCEDURE — 99999 PR PBB SHADOW E&M-EST. PATIENT-LVL III: CPT | Mod: PBBFAC,,, | Performed by: STUDENT IN AN ORGANIZED HEALTH CARE EDUCATION/TRAINING PROGRAM

## 2023-01-10 PROCEDURE — 99999 PR PBB SHADOW E&M-EST. PATIENT-LVL III: ICD-10-PCS | Mod: PBBFAC,,, | Performed by: STUDENT IN AN ORGANIZED HEALTH CARE EDUCATION/TRAINING PROGRAM

## 2023-01-10 PROCEDURE — 99214 OFFICE O/P EST MOD 30 MIN: CPT | Mod: S$PBB,,, | Performed by: STUDENT IN AN ORGANIZED HEALTH CARE EDUCATION/TRAINING PROGRAM

## 2023-01-10 PROCEDURE — 99214 PR OFFICE/OUTPT VISIT, EST, LEVL IV, 30-39 MIN: ICD-10-PCS | Mod: S$PBB,,, | Performed by: STUDENT IN AN ORGANIZED HEALTH CARE EDUCATION/TRAINING PROGRAM

## 2023-01-10 RX ORDER — METHYLPREDNISOLONE 4 MG/1
TABLET ORAL
Qty: 21 TABLET | Refills: 0 | Status: SHIPPED | OUTPATIENT
Start: 2023-01-10 | End: 2023-02-01

## 2023-01-10 RX ORDER — CLINDAMYCIN HYDROCHLORIDE 300 MG/1
600 CAPSULE ORAL EVERY 8 HOURS
Qty: 42 CAPSULE | Refills: 0 | Status: SHIPPED | OUTPATIENT
Start: 2023-01-10 | End: 2023-01-17

## 2023-01-10 NOTE — PROGRESS NOTES
Chief complaint:    Chief Complaint   Patient presents with    Sore Throat     Swollen tonsils           Referring Provider:  Aaareferral Self  No address on file      History of present illness:     Mr. Martinez is a 22 y.o. presenting for evaluation of chronic tonsillitis.     The patient reports tonsil symptoms including: sore throats, uncomfortable when swallowing, coughing, tonsil swelling which has not resolved, snoring; present all the time   Symptoms have been present for 4 months  There have been  episodes of pharyngitis/tonsillitis in the last year.   Severity of episodes: severe  Treatment has included: no antibiotics or steroids     The patient denies voice change, otalgia, unintentional weight loss or neck mass.      History      Past Medical History:   Past Medical History:   Diagnosis Date    Anxiety     Patient denies medical problems          Past Surgical History:  Past Surgical History:   Procedure Laterality Date    EXAMINATION UNDER ANESTHESIA Left 2/15/2019    Procedure: EXAM UNDER ANESTHESIA;  Surgeon: Pedro Hall MD;  Location: [facility];  Service: Orthopedics;  Laterality: Left;    SHOULDER ARTHROSCOPY Left 2/15/2019    Procedure: ARTHROSCOPY, SHOULDER;  Surgeon: Pedro Hall MD;  Location: [facility] OR;  Service: Orthopedics;  Laterality: Left;    SHOULDER ARTHROSCOPY W/ SUPERIOR LABRAL ANTERIOR POSTERIOR LESION REPAIR Left 2/15/2019    Procedure: ARTHROSCOPY, SHOULDER, WITH SLAP REPAIR;  Surgeon: Pedro Hall MD;  Location: [facility] OR;  Service: Orthopedics;  Laterality: Left;         Medications: Medication list reviewed. He  has a current medication list which includes the following prescription(s): buspirone, mucinex dm, escitalopram oxalate, cetirizine, clindamycin, fluticasone propionate, methylprednisolone, and naproxen.     Allergies:   Review of patient's allergies indicates:   Allergen Reactions    Penicillins          Family history: family history is not on file.          Social History          Alcohol use:  reports current alcohol use of about 1.0 standard drink per week.            Tobacco:  reports that he has never smoked. He has never used smokeless tobacco.         Physical Examination      Vitals: Temperature 97.5 °F (36.4 °C), temperature source Temporal, weight 72.3 kg (159 lb 6.3 oz).      General: Well developed, well nourished, well hydrated.     Voice: no dysphonia, no dysarthria      Head/Face: Normocephalic, atraumatic. No scars or lesions. Facial musculature equal.     Eyes: No scleral icterus or conjunctival hemorrhage. EOMI. PERRLA.     Ears:     Right ear: No gross deformity. EAC is clear of debris and erythema. TM are intact with a pneumatized middle ear. No signs of retraction, fluid or infection.      Left ear: No gross deformity. EAC is clear of debris and erythema. TM are intact with a pneumatized middle ear. No signs of retraction, fluid or infection.      Nose: No gross deformity or lesions. No purulent discharge. No significant NSD.      Mouth/Oropharynx: Lips without any lesions. No mucosal lesions within the oropharynx. Pharyngeal walls symmetrical. Uvula midline. Tongue midline without lesions. Tonsils are 3+ and symmetric with inflamed appearance. Tonsilliths are not present.    Neck: Trachea midline. No masses. No thyromegaly or nodules palpated.     Lymphatic: No lymphadenopathy in the neck.     Extremities: No cyanosis. Warm and well-perfused.     Skin: No scars or lesions on face or neck.      Neurologic: Moving all extremities without gross abnormality.CN II-XII grossly intact. House-Brackmann 1/6. No signs of nystagmus.          Data reviewed      Review of records:      I reviewed records from the referring provider's office visits including the history, workup, and/or treatment of this problem thus far.    Labs reviewed    Component Ref Range & Units 9 mo ago    Isai-Barr Virus IgG (VCA) Negative Positive Abnormal       Molecular Strep  A, POC Negative Negative           Acceptable  Yes            POC Molecular Influenza A Ag Negative, Not Reported Negative          POC Molecular Influenza B Ag Negative, Not Reported Negative           Acceptable  Yes              Assessment/Plan:    1. Chronic tonsillitis    2. Enlarged tonsils    3. Tonsillar hypertrophy         Dameon has chronic tonsillitis developed after acute infection about 4 months ago.  We discussed conservative measures during infections vs proceeding with tonsillectomy.     We discussed treating first with a round of steroids and antibiotics. If this does not make a significant impact on his symptoms or tonsillar hypertrophy then will proceed with tonsillectomy.      The risks and benefits of surgery were discussed at length including, but not limited to, respiratory distress, hemorrhage, regurgitation of food/liquids into the nasopharynx, voice changes, dehydration and pain.  The patient had the opportunity to ask questions to their satisfaction.            Prabhakar Harden MD  [facility]   [facility]  [address] [place] [address].  [place] [address]  P: [phone]  F: [phone]

## 2023-02-01 ENCOUNTER — OFFICE VISIT (OUTPATIENT)
Dept: FAMILY MEDICINE | Facility: CLINIC | Age: 23
End: 2023-02-01
Payer: OTHER GOVERNMENT

## 2023-02-01 VITALS
HEART RATE: 62 BPM | SYSTOLIC BLOOD PRESSURE: 130 MMHG | DIASTOLIC BLOOD PRESSURE: 80 MMHG | HEIGHT: 72 IN | TEMPERATURE: 98 F | WEIGHT: 158.5 LBS | BODY MASS INDEX: 21.47 KG/M2 | OXYGEN SATURATION: 99 %

## 2023-02-01 DIAGNOSIS — F41.1 GAD (GENERALIZED ANXIETY DISORDER): Primary | ICD-10-CM

## 2023-02-01 DIAGNOSIS — R53.83 FATIGUE, UNSPECIFIED TYPE: ICD-10-CM

## 2023-02-01 PROCEDURE — 99213 OFFICE O/P EST LOW 20 MIN: CPT | Mod: PBBFAC,PO | Performed by: NURSE PRACTITIONER

## 2023-02-01 PROCEDURE — 99214 OFFICE O/P EST MOD 30 MIN: CPT | Mod: S$PBB,,, | Performed by: NURSE PRACTITIONER

## 2023-02-01 PROCEDURE — 99999 PR PBB SHADOW E&M-EST. PATIENT-LVL III: ICD-10-PCS | Mod: PBBFAC,,, | Performed by: NURSE PRACTITIONER

## 2023-02-01 PROCEDURE — 99999 PR PBB SHADOW E&M-EST. PATIENT-LVL III: CPT | Mod: PBBFAC,,, | Performed by: NURSE PRACTITIONER

## 2023-02-01 PROCEDURE — 99214 PR OFFICE/OUTPT VISIT, EST, LEVL IV, 30-39 MIN: ICD-10-PCS | Mod: S$PBB,,, | Performed by: NURSE PRACTITIONER

## 2023-02-01 RX ORDER — BUSPIRONE HYDROCHLORIDE 10 MG/1
10 TABLET ORAL 3 TIMES DAILY
Qty: 270 TABLET | Refills: 0 | Status: SHIPPED | OUTPATIENT
Start: 2023-02-01 | End: 2024-02-01 | Stop reason: SDUPTHER

## 2023-02-01 RX ORDER — ESCITALOPRAM OXALATE 20 MG/1
20 TABLET ORAL DAILY
Qty: 90 TABLET | Refills: 3 | Status: SHIPPED | OUTPATIENT
Start: 2023-02-01 | End: 2023-07-07 | Stop reason: SDUPTHER

## 2023-02-01 NOTE — PROGRESS NOTES
"Subjective:       Patient ID: Dameon Martinez is a 22 y.o. male.    Chief Complaint: Anxiety  Pt reports to clinic for follow up evaluation of anxiety and depression.  Reports, "medication is doing good".  Some fatigue despite sleeping 9hours/nightly.  Reports some "nervousness here and there" but not compliant with buspar.  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  No SI or HI    Past Medical History:   Diagnosis Date    Anxiety     Patient denies medical problems       Anxiety  Presents for follow-up visit. Symptoms include excessive worry, nervous/anxious behavior and panic. Symptoms occur occasionally. The quality of sleep is good.       Review of Systems   Constitutional: Negative.    HENT: Negative.     Respiratory: Negative.     Cardiovascular: Negative.    Gastrointestinal: Negative.    Genitourinary: Negative.    Musculoskeletal: Negative.    Skin: Negative.    Neurological: Negative.    Psychiatric/Behavioral:  The patient is nervous/anxious.      Objective:      Physical Exam  Vitals reviewed.   Constitutional:       Appearance: He is well-developed.   HENT:      Head: Normocephalic.   Eyes:      Pupils: Pupils are equal, round, and reactive to light.   Cardiovascular:      Rate and Rhythm: Normal rate and regular rhythm.      Heart sounds: Normal heart sounds.   Pulmonary:      Effort: No respiratory distress.      Breath sounds: Normal breath sounds.   Abdominal:      General: Bowel sounds are normal.      Palpations: Abdomen is soft.   Musculoskeletal:         General: Normal range of motion.      Cervical back: Normal range of motion.   Skin:     General: Skin is warm and dry.   Neurological:      Mental Status: He is alert and oriented to person, place, and time.       Assessment:       1. YASIR (generalized anxiety disorder)    2. Fatigue, unspecified type          Plan:   YASIR (generalized anxiety disorder)  -     EScitalopram oxalate (LEXAPRO) 20 MG tablet; Take 1 tablet (20 mg total) by mouth once " daily.  Dispense: 90 tablet; Refill: 3  -     busPIRone (BUSPAR) 10 MG tablet; Take 1 tablet (10 mg total) by mouth 3 (three) times daily.  Dispense: 270 tablet; Refill: 0  Begin use of buspar daily.  Increase to bid or tid if needed.  Follow up for worsening symptoms   Fatigue, unspecified type  -     CBC Auto Differential; Future; Expected date: 02/01/2023  -     Comprehensive Metabolic Panel; Future; Expected date: 02/01/2023  -     TSH; Future; Expected date: 02/01/2023  -     HEPATITIS C ANTIBODY; Future; Expected date: 02/01/2023  -     HIV 1/2 Ag/Ab (4th Gen); Future; Expected date: 02/01/2023      No follow-ups on file.

## 2023-02-02 ENCOUNTER — OFFICE VISIT (OUTPATIENT)
Dept: OTOLARYNGOLOGY | Facility: CLINIC | Age: 23
End: 2023-02-02
Payer: OTHER GOVERNMENT

## 2023-02-02 VITALS
TEMPERATURE: 98 F | DIASTOLIC BLOOD PRESSURE: 61 MMHG | SYSTOLIC BLOOD PRESSURE: 124 MMHG | WEIGHT: 158.31 LBS | HEART RATE: 57 BPM | BODY MASS INDEX: 21.47 KG/M2

## 2023-02-02 DIAGNOSIS — J35.1 TONSILLAR HYPERTROPHY: ICD-10-CM

## 2023-02-02 DIAGNOSIS — J35.01 CHRONIC TONSILLITIS: Primary | ICD-10-CM

## 2023-02-02 DIAGNOSIS — J35.8 TONSILLITH: ICD-10-CM

## 2023-02-02 PROCEDURE — 99214 OFFICE O/P EST MOD 30 MIN: CPT | Mod: PBBFAC | Performed by: STUDENT IN AN ORGANIZED HEALTH CARE EDUCATION/TRAINING PROGRAM

## 2023-02-02 PROCEDURE — 99999 PR PBB SHADOW E&M-EST. PATIENT-LVL IV: ICD-10-PCS | Mod: PBBFAC,,, | Performed by: STUDENT IN AN ORGANIZED HEALTH CARE EDUCATION/TRAINING PROGRAM

## 2023-02-02 PROCEDURE — 99213 OFFICE O/P EST LOW 20 MIN: CPT | Mod: S$PBB,,, | Performed by: STUDENT IN AN ORGANIZED HEALTH CARE EDUCATION/TRAINING PROGRAM

## 2023-02-02 PROCEDURE — 99213 PR OFFICE/OUTPT VISIT, EST, LEVL III, 20-29 MIN: ICD-10-PCS | Mod: S$PBB,,, | Performed by: STUDENT IN AN ORGANIZED HEALTH CARE EDUCATION/TRAINING PROGRAM

## 2023-02-02 PROCEDURE — 99999 PR PBB SHADOW E&M-EST. PATIENT-LVL IV: CPT | Mod: PBBFAC,,, | Performed by: STUDENT IN AN ORGANIZED HEALTH CARE EDUCATION/TRAINING PROGRAM

## 2023-02-02 NOTE — PROGRESS NOTES
Chief complaint:    Chief Complaint   Patient presents with    Follow-up           Referring Provider:  No referring provider defined for this encounter.      History of present illness:     Mr. Martinez is a 22 y.o. presenting for evaluation of chronic tonsillitis.     The patient reports tonsil symptoms including: sore throats, uncomfortable when swallowing, coughing, tonsil swelling which has not resolved, snoring; present all the time   Symptoms have been present for 4 months  There have been  episodes of pharyngitis/tonsillitis in the last year.   Severity of episodes: severe  Treatment has included: no antibiotics or steroids     The patient denies voice change, otalgia, unintentional weight loss or neck mass.    Update 2/2/23  Some improvement with sore throat, swallowing discomfort with steroids, but almost immediately recurred.     Now with nearly 5 months of almost continuous symptoms.        History      Past Medical History:   Past Medical History:   Diagnosis Date    Anxiety     Patient denies medical problems          Past Surgical History:  Past Surgical History:   Procedure Laterality Date    EXAMINATION UNDER ANESTHESIA Left 2/15/2019    Procedure: EXAM UNDER ANESTHESIA;  Surgeon: Pedro Hall MD;  Location: [facility] OR;  Service: Orthopedics;  Laterality: Left;    SHOULDER ARTHROSCOPY Left 2/15/2019    Procedure: ARTHROSCOPY, SHOULDER;  Surgeon: Pedro Hall MD;  Location: [facility] OR;  Service: Orthopedics;  Laterality: Left;    SHOULDER ARTHROSCOPY W/ SUPERIOR LABRAL ANTERIOR POSTERIOR LESION REPAIR Left 2/15/2019    Procedure: ARTHROSCOPY, SHOULDER, WITH SLAP REPAIR;  Surgeon: Pedro Hall MD;  Location: [facility] OR;  Service: Orthopedics;  Laterality: Left;         Medications: Medication list reviewed. He  has a current medication list which includes the following prescription(s): buspirone, cetirizine, mucinex dm, escitalopram oxalate, and fluticasone propionate.     Allergies:    Review of patient's allergies indicates:   Allergen Reactions    Penicillins          Family history: family history is not on file.         Social History          Alcohol use:  reports current alcohol use of about 1.0 standard drink per week.            Tobacco:  reports that he has never smoked. He has never used smokeless tobacco.         Physical Examination      Vitals: Blood pressure 124/61, pulse (!) 57, temperature 98.1 °F (36.7 °C), temperature source Temporal, weight 71.8 kg (158 lb 4.6 oz).      General: Well developed, well nourished, well hydrated.     Voice: no dysphonia, no dysarthria      Head/Face: Normocephalic, atraumatic. No scars or lesions. Facial musculature equal.     Eyes: No scleral icterus or conjunctival hemorrhage. EOMI. PERRLA.     Ears:     Right ear: No gross deformity. EAC is clear of debris and erythema. TM are intact with a pneumatized middle ear. No signs of retraction, fluid or infection.      Left ear: No gross deformity. EAC is clear of debris and erythema. TM are intact with a pneumatized middle ear. No signs of retraction, fluid or infection.      Nose: No gross deformity or lesions. No purulent discharge. No significant NSD.      Mouth/Oropharynx: Lips without any lesions. No mucosal lesions within the oropharynx. Pharyngeal walls symmetrical. Uvula midline. Tongue midline without lesions. Tonsils are 3+ and symmetric with tonsilliths     Neck: Trachea midline. No masses. No thyromegaly or nodules palpated.     Lymphatic: No lymphadenopathy in the neck.     Extremities: No cyanosis. Warm and well-perfused.     Skin: No scars or lesions on face or neck.      Neurologic: Moving all extremities without gross abnormality.CN II-XII grossly intact. House-Brackmann 1/6. No signs of nystagmus.          Data reviewed      Review of records:      I reviewed records from the referring provider's office visits including the history, workup, and/or treatment of this problem thus  far.    Labs reviewed    Component Ref Range & Units 9 mo ago    Isai-Barr Virus IgG (VCA) Negative Positive Abnormal       Molecular Strep A, POC Negative Negative           Acceptable  Yes            POC Molecular Influenza A Ag Negative, Not Reported Negative          POC Molecular Influenza B Ag Negative, Not Reported Negative           Acceptable  Yes              Assessment/Plan:    1. Chronic tonsillitis    2. Tonsillar hypertrophy    3. Tonsillith           Dameon has chronic tonsillitis developed after acute infection about 4 months ago.  We discussed conservative measures during infections vs proceeding with tonsillectomy.     We discussed treating first with a round of steroids and antibiotics. If this does not make a significant impact on his symptoms or tonsillar hypertrophy then will proceed with tonsillectomy.      The risks and benefits of surgery were discussed at length including, but not limited to, respiratory distress, hemorrhage, regurgitation of food/liquids into the nasopharynx, voice changes, dehydration and pain.  The patient had the opportunity to ask questions to their satisfaction.     Update 2/2/23  Persistent symptoms despite medical therapy. Despite risks he elected to proceed with tonsillectomy. He will call with surgical date. Risks and benefits again discussed, as noted above.               Prabhakar Harden MD  [facility]   [facility]  [id number] [place] [address].  ELIZABETH Escobar [id number]  P: [phone]  F: [phone]

## 2023-03-11 NOTE — PATIENT INSTRUCTIONS
Children younger than 13 must be in the rear seat of a vehicle when available and properly restrained.  If you have an active [facility] account, please look for your well child questionnaire to come to your [facility] account before your next well child visit.  
oral

## 2023-03-20 ENCOUNTER — OFFICE VISIT (OUTPATIENT)
Dept: URGENT CARE | Facility: CLINIC | Age: 23
End: 2023-03-20
Payer: OTHER GOVERNMENT

## 2023-03-20 VITALS
HEART RATE: 85 BPM | HEIGHT: 72 IN | WEIGHT: 160 LBS | SYSTOLIC BLOOD PRESSURE: 130 MMHG | DIASTOLIC BLOOD PRESSURE: 68 MMHG | RESPIRATION RATE: 18 BRPM | TEMPERATURE: 103 F | BODY MASS INDEX: 21.67 KG/M2 | OXYGEN SATURATION: 100 %

## 2023-03-20 DIAGNOSIS — J02.9 SORE THROAT: Primary | ICD-10-CM

## 2023-03-20 LAB
CTP QC/QA: YES
MOLECULAR STREP A: POSITIVE
POC MOLECULAR INFLUENZA A AGN: NEGATIVE
POC MOLECULAR INFLUENZA B AGN: NEGATIVE
SARS-COV-2 AG RESP QL IA.RAPID: NEGATIVE

## 2023-03-20 PROCEDURE — 87811 SARS CORONAVIRUS 2 ANTIGEN POCT, MANUAL READ: ICD-10-PCS | Mod: QW,S$GLB,, | Performed by: PHYSICIAN ASSISTANT

## 2023-03-20 PROCEDURE — 87811 SARS-COV-2 COVID19 W/OPTIC: CPT | Mod: QW,S$GLB,, | Performed by: PHYSICIAN ASSISTANT

## 2023-03-20 PROCEDURE — 87651 POCT STREP A MOLECULAR: ICD-10-PCS | Mod: QW,S$GLB,, | Performed by: PHYSICIAN ASSISTANT

## 2023-03-20 PROCEDURE — 99213 OFFICE O/P EST LOW 20 MIN: CPT | Mod: S$GLB,,, | Performed by: PHYSICIAN ASSISTANT

## 2023-03-20 PROCEDURE — 99213 PR OFFICE/OUTPT VISIT, EST, LEVL III, 20-29 MIN: ICD-10-PCS | Mod: S$GLB,,, | Performed by: PHYSICIAN ASSISTANT

## 2023-03-20 PROCEDURE — 87651 STREP A DNA AMP PROBE: CPT | Mod: QW,S$GLB,, | Performed by: PHYSICIAN ASSISTANT

## 2023-03-20 PROCEDURE — 87502 POCT INFLUENZA A/B MOLECULAR: ICD-10-PCS | Mod: QW,S$GLB,, | Performed by: PHYSICIAN ASSISTANT

## 2023-03-20 PROCEDURE — 87502 INFLUENZA DNA AMP PROBE: CPT | Mod: QW,S$GLB,, | Performed by: PHYSICIAN ASSISTANT

## 2023-03-20 RX ORDER — IBUPROFEN 200 MG
400 TABLET ORAL
Status: COMPLETED | OUTPATIENT
Start: 2023-03-20 | End: 2023-03-20

## 2023-03-20 RX ORDER — AZITHROMYCIN 250 MG/1
TABLET, FILM COATED ORAL
Qty: 6 TABLET | Refills: 0 | Status: SHIPPED | OUTPATIENT
Start: 2023-03-20 | End: 2023-03-25

## 2023-03-20 RX ADMIN — Medication 400 MG: at 05:03

## 2023-03-20 NOTE — PROGRESS NOTES
Subjective:       Patient ID: Dameon Martinez is a 22 y.o. male.    Vitals:  height is 6' (1.829 m) and weight is 72.6 kg (160 lb). His oral temperature is 102.6 °F (39.2 °C) (abnormal). His blood pressure is 130/68 and his pulse is 85. His respiration is 18 and oxygen saturation is 100%.     Chief Complaint: Sore Throat    Patient is a 22 year old male with a one day hsitory of sore throat. Patient has associated congestion, fever, chills, aches. No nausea, vomiting or diarrhea. No chest pain or shortness of breath. No known sick contacts.     Sore Throat   This is a new problem. The current episode started yesterday. The problem has been gradually worsening. There has been no fever. The pain is at a severity of 6/10. The pain is moderate. Associated symptoms include congestion, coughing, headaches, a plugged ear sensation and trouble swallowing. Pertinent negatives include no abdominal pain, diarrhea, ear discharge, ear pain, shortness of breath or vomiting. He has had no exposure to strep or mono.     Constitution: Negative for chills and fever.   HENT:  Positive for congestion, sore throat and trouble swallowing. Negative for ear pain, ear discharge, postnasal drip, sinus pain and sinus pressure.    Neck: Negative for painful lymph nodes.   Cardiovascular:  Negative for chest pain.   Respiratory:  Positive for cough. Negative for sputum production and shortness of breath.    Gastrointestinal:  Negative for abdominal pain, nausea, vomiting and diarrhea.   Musculoskeletal:  Negative for muscle ache.   Neurological:  Positive for headaches.   Hematologic/Lymphatic: Negative for swollen lymph nodes.     Objective:      Physical Exam   Constitutional: He is oriented to person, place, and time. He appears well-developed. He is cooperative.  Non-toxic appearance. He does not appear ill. No distress.   HENT:   Head: Normocephalic and atraumatic.   Ears:   Right Ear: Hearing, tympanic membrane, external ear and ear canal  normal.   Left Ear: Hearing, tympanic membrane, external ear and ear canal normal.   Nose: Nose normal. No mucosal edema, rhinorrhea or nasal deformity. No epistaxis. Right sinus exhibits no maxillary sinus tenderness and no frontal sinus tenderness. Left sinus exhibits no maxillary sinus tenderness and no frontal sinus tenderness.   Mouth/Throat: Uvula is midline and mucous membranes are normal. No trismus in the jaw. Normal dentition. No uvula swelling. Oropharyngeal exudate and posterior oropharyngeal erythema present. No posterior oropharyngeal edema.   Eyes: Conjunctivae and lids are normal. No scleral icterus.   Neck: Trachea normal and phonation normal. Neck supple. No edema present. No erythema present. No neck rigidity present.   Cardiovascular: Normal rate, regular rhythm, normal heart sounds and normal pulses.   No murmur heard.  Pulmonary/Chest: Effort normal and breath sounds normal. No respiratory distress. He has no decreased breath sounds. He has no wheezes. He has no rhonchi.   Abdominal: Normal appearance.   Musculoskeletal: Normal range of motion.         General: No deformity. Normal range of motion.   Lymphadenopathy:     He has cervical adenopathy.   Neurological: He is alert and oriented to person, place, and time. He exhibits normal muscle tone. Coordination normal.   Skin: Skin is warm, dry, intact, not diaphoretic and not pale.   Psychiatric: His speech is normal and behavior is normal. Judgment and thought content normal.   Nursing note and vitals reviewed.      Results for orders placed or performed in visit on 03/20/23   POCT Strep A, Molecular   Result Value Ref Range    Molecular Strep A, POC Positive (A) Negative     Acceptable Yes    SARS Coronavirus 2 Antigen, POCT Manual Read   Result Value Ref Range    SARS Coronavirus 2 Antigen Negative Negative     Acceptable Yes    POCT Influenza A/B MOLECULAR   Result Value Ref Range    POC Molecular Influenza A  Ag Negative Negative, Not Reported    POC Molecular Influenza B Ag Negative Negative, Not Reported     Acceptable Yes        Assessment:       1. Sore throat          Plan:         Sore throat  -     POCT Strep A, Molecular  -     SARS Coronavirus 2 Antigen, POCT Manual Read  -     POCT Influenza A/B MOLECULAR  -     ibuprofen tablet 400 mg  -     azithromycin (Z-HEAVENLY) 250 MG tablet; Take 2 tablets by mouth on day 1; Take 1 tablet by mouth on days 2-5  Dispense: 6 tablet; Refill: 0    Discussed results with patient.  Discussed use of OTC medications for symptom control as well as abx for strep.   All ER precautions covered including but not limited to shortness of breath, intractable fever, or chest pain.  Discussed RTC if symptoms worsen, change, or persist.     Patient verbalized understanding and agreed with the plan.     Irene Mendez PA-C    Patient Instructions   PLEASE READ YOUR DISCHARGE INSTRUCTIONS ENTIRELY AS IT CONTAINS IMPORTANT INFORMATION.      Please drink plenty of fluids.    Please get plenty of rest.    Please return here or go to the Emergency Department for any concerns or worsening of condition.    Please take an over the counter antihistamine medication (allegra/Claritin/Zyrtec) of your choice as directed.    Try an over the counter decongestant like Mucinex D or Sudafed. You buy this behind the pharmacy counter    If you do have Hypertension or palpitations, it is safe to take Coricidin HBP for relief of sinus symptoms.    If not allergic, please take over the counter Tylenol (Acetaminophen) and/or Motrin (Ibuprofen) as directed for control of pain and/or fever.  Please follow up with your primary care doctor or specialist as needed.    Sore throat recommendations: Warm fluids, warm salt water gargles, throat lozenges, tea, honey, soup, rest, hydration.    Use over the counter flonase: one spray each nostril twice daily OR two sprays each nostril once daily.     Sinus rinses DO  NOT USE TAP WATER, if you must, water must be a rolling boil for 1 minute, let it cool, then use.  May use distilled water, or over the counter nasal saline rinses.  Vics vapor rub in shower to help open nasal passages.  May use nasal gel to keep passages moisturized.  May use Nasal saline sprays during the day for added relief of congestion.   For those who go to the gym, please do not use the sauna or steam room now to clear sinuses.    If you  smoke, please stop smoking.      Please return or see your primary care doctor if you develop new or worsening symptoms.     Please arrange follow up with your primary medical clinic as soon as possible. You must understand that you've received an Urgent Care treatment only and that you may be released before all of your medical problems are known or treated. You, the patient, will arrange for follow up as instructed. If your symptoms worsen or fail to improve you should go to the Emergency Room.

## 2023-03-20 NOTE — LETTER
March 20, 2023      [facility]  [address] [place] [address] [place] [address]  Phone: [phone]  Fax: [phone]       Patient: Dameon Martinez   YOB: 2000  Date of Visit: 03/20/2023    To Whom It May Concern:    Mary Martinez  was at [facility] on 03/20/2023. The patient may return to work/school on 3/22/23 with no restrictions. If you have any questions or concerns, or if I can be of further assistance, please do not hesitate to contact me.    Sincerely,    Irene Mendez PA-C

## 2023-03-22 ENCOUNTER — OFFICE VISIT (OUTPATIENT)
Dept: URGENT CARE | Facility: CLINIC | Age: 23
End: 2023-03-22
Payer: OTHER GOVERNMENT

## 2023-03-22 VITALS
TEMPERATURE: 102 F | WEIGHT: 160 LBS | BODY MASS INDEX: 21.67 KG/M2 | RESPIRATION RATE: 16 BRPM | SYSTOLIC BLOOD PRESSURE: 121 MMHG | HEART RATE: 95 BPM | HEIGHT: 72 IN | DIASTOLIC BLOOD PRESSURE: 69 MMHG | OXYGEN SATURATION: 96 %

## 2023-03-22 DIAGNOSIS — J02.0 STREP PHARYNGITIS: Primary | ICD-10-CM

## 2023-03-22 PROCEDURE — 96372 THER/PROPH/DIAG INJ SC/IM: CPT | Mod: S$GLB,,, | Performed by: PHYSICIAN ASSISTANT

## 2023-03-22 PROCEDURE — 99214 OFFICE O/P EST MOD 30 MIN: CPT | Mod: 25,S$GLB,, | Performed by: PHYSICIAN ASSISTANT

## 2023-03-22 PROCEDURE — 99214 PR OFFICE/OUTPT VISIT, EST, LEVL IV, 30-39 MIN: ICD-10-PCS | Mod: 25,S$GLB,, | Performed by: PHYSICIAN ASSISTANT

## 2023-03-22 PROCEDURE — 96372 PR INJECTION,THERAP/PROPH/DIAG2ST, IM OR SUBCUT: ICD-10-PCS | Mod: S$GLB,,, | Performed by: PHYSICIAN ASSISTANT

## 2023-03-22 RX ORDER — CEFTRIAXONE 1 G/1
1 INJECTION, POWDER, FOR SOLUTION INTRAMUSCULAR; INTRAVENOUS
Status: COMPLETED | OUTPATIENT
Start: 2023-03-22 | End: 2023-03-22

## 2023-03-22 RX ORDER — CLINDAMYCIN HYDROCHLORIDE 300 MG/1
300 CAPSULE ORAL EVERY 8 HOURS
Qty: 30 CAPSULE | Refills: 0 | Status: SHIPPED | OUTPATIENT
Start: 2023-03-22 | End: 2023-04-01

## 2023-03-22 RX ORDER — DEXAMETHASONE SODIUM PHOSPHATE 100 MG/10ML
8 INJECTION INTRAMUSCULAR; INTRAVENOUS
Status: COMPLETED | OUTPATIENT
Start: 2023-03-22 | End: 2023-03-22

## 2023-03-22 RX ORDER — IBUPROFEN 800 MG/1
800 TABLET ORAL EVERY 6 HOURS PRN
Qty: 28 TABLET | Refills: 0 | Status: SHIPPED | OUTPATIENT
Start: 2023-03-22 | End: 2023-03-29

## 2023-03-22 RX ORDER — LIDOCAINE HYDROCHLORIDE 20 MG/ML
1 INJECTION, SOLUTION INFILTRATION; PERINEURAL
Status: DISCONTINUED | OUTPATIENT
Start: 2023-03-22 | End: 2023-03-22

## 2023-03-22 RX ADMIN — CEFTRIAXONE 1 G: 1 INJECTION, POWDER, FOR SOLUTION INTRAMUSCULAR; INTRAVENOUS at 10:03

## 2023-03-22 RX ADMIN — DEXAMETHASONE SODIUM PHOSPHATE 8 MG: 100 INJECTION INTRAMUSCULAR; INTRAVENOUS at 10:03

## 2023-03-22 NOTE — LETTER
March 22, 2023      [facility]  [address] [place] [address] [place] [address]  Phone: [phone]  Fax: [phone]       Patient: Dameon Martinez   YOB: 2000  Date of Visit: 03/22/2023    To Whom It May Concern:    Mary Martinez  was at [facility] on 03/22/2023. The patient may return to work/school on 03/24/23 with restrictions. He can return if fever free for 24 hours. If you have any questions or concerns, or if I can be of further assistance, please do not hesitate to contact me.    Sincerely,    Argentina Zimmerman PA-C

## 2023-03-22 NOTE — PROGRESS NOTES
Subjective:       Patient ID: Dameon Martinez is a 22 y.o. male.    Vitals:  height is 6' (1.829 m) and weight is 72.6 kg (160 lb). His temperature is 102.2 °F (39 °C) (abnormal). His blood pressure is 121/69 and his pulse is 95. His respiration is 16 and oxygen saturation is 96%.     Chief Complaint: Sore Throat    Pt present for sore throat that started 4 days ago. Pt tested positive for strep at clinic 2 days ago. Has one more dose of azithromycin to take but states it has not helped. Pt states he feels worse and is still running high fever. Able to swallow liquids without difficulty but states it is pretty painful.     Sore Throat   This is a new problem. Episode onset: 4 days ago. The problem has been gradually worsening. The maximum temperature recorded prior to his arrival was 102 - 102.9 F. The pain is at a severity of 9/10. Associated symptoms include congestion, diarrhea, ear pain, headaches, a hoarse voice, neck pain and swollen glands. Pertinent negatives include no abdominal pain, coughing, drooling, ear discharge, plugged ear sensation, shortness of breath, stridor, trouble swallowing or vomiting. Treatments tried: z-armond, buspar. The treatment provided no relief.     Constitution: Positive for chills, fatigue and fever.   HENT:  Positive for ear pain, congestion and sore throat. Negative for ear discharge, drooling and trouble swallowing.    Neck: Positive for neck pain. Negative for neck stiffness.   Respiratory:  Negative for cough, shortness of breath and stridor.    Gastrointestinal:  Positive for diarrhea. Negative for abdominal pain and vomiting.   Musculoskeletal:  Positive for muscle ache.   Neurological:  Positive for headaches.     Objective:      Physical Exam   Constitutional: He is oriented to person, place, and time. He appears well-developed. He is cooperative.  Non-toxic appearance. He does not appear ill. No distress.   HENT:   Head: Normocephalic and atraumatic.   Ears:   Right Ear:  Hearing, tympanic membrane, external ear and ear canal normal.   Left Ear: Hearing, tympanic membrane, external ear and ear canal normal.   Nose: Nose normal. No mucosal edema, rhinorrhea or nasal deformity. No epistaxis. Right sinus exhibits no maxillary sinus tenderness and no frontal sinus tenderness. Left sinus exhibits no maxillary sinus tenderness and no frontal sinus tenderness.   Mouth/Throat: Uvula is midline and mucous membranes are normal. No trismus in the jaw. Normal dentition. No uvula swelling. Oropharyngeal exudate and posterior oropharyngeal erythema present. No posterior oropharyngeal edema or tonsillar abscesses. Tonsils are 2+ on the right. Tonsils are 2+ on the left. Tonsillar exudate.   Eyes: Conjunctivae and lids are normal. No scleral icterus.   Neck: Trachea normal and phonation normal. Neck supple. No edema present. No erythema present. No neck rigidity present.   Cardiovascular: Normal rate, regular rhythm, normal heart sounds and normal pulses.   Pulmonary/Chest: Effort normal and breath sounds normal. No respiratory distress. He has no decreased breath sounds. He has no rhonchi.   Abdominal: Normal appearance.   Musculoskeletal: Normal range of motion.         General: No deformity. Normal range of motion.   Lymphadenopathy:     He has cervical adenopathy.   Neurological: He is alert and oriented to person, place, and time. He exhibits normal muscle tone. Coordination normal.   Skin: Skin is warm, dry, intact, not diaphoretic and not pale.   Psychiatric: His speech is normal and behavior is normal. Judgment and thought content normal.   Nursing note and vitals reviewed.      Assessment:       1. Strep pharyngitis          Plan:       No PTA. 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 Given IM rocephin - hx of PCN allergy but no anaphylaxis. Did monitor for 20 minutes and tolerated rocephin well. Start oral clindamycin.     Pt has been in communication with ENT - needs to get tonsillectomy  scheduled. Instructed to call ENT and make f/u appt.    Strep pharyngitis  -     cefTRIAXone injection 1 g  -     dexAMETHasone injection 8 mg  -     clindamycin (CLEOCIN) 300 MG capsule; Take 1 capsule (300 mg total) by mouth every 8 (eight) hours. for 10 days  Dispense: 30 capsule; Refill: 0  -     ibuprofen (ADVIL,MOTRIN) 800 MG tablet; Take 1 tablet (800 mg total) by mouth every 6 (six) hours as needed for Pain or Temperature greater than (100).  Dispense: 28 tablet; Refill: 0    Other orders  -     Discontinue: LIDOcaine HCL 20 mg/ml (2%) injection 1 mL    Argentina Zimmerman PA-C  [facility]       Patient Instructions   You received steroid shot and shot of ROCEPHIN antibiotic today. You can start the oral clindamycin immediately at home. Salt water gargles, ibuprofen 800mg every 6 hours for pain/fever. Rest and drink plenty of fluids. May buy over the counter Chloraseptic Lozenges or spray for severe pain.    Monitor for any severe pain, difficulty swallowing liquids, unilateral severe tonsil swelling or severe neck stiffness/severe headache - warrants emergent re-evaluation.

## 2023-03-22 NOTE — PATIENT INSTRUCTIONS
You received steroid shot and shot of ROCEPHIN antibiotic today. You can start the oral clindamycin immediately at home. Salt water gargles, ibuprofen 800mg every 6 hours for pain/fever. Rest and drink plenty of fluids. May buy over the counter Chloraseptic Lozenges or spray for severe pain.    Monitor for any severe pain, difficulty swallowing liquids, unilateral severe tonsil swelling or severe neck stiffness/severe headache - warrants emergent re-evaluation.

## 2023-03-24 ENCOUNTER — PATIENT MESSAGE (OUTPATIENT)
Dept: OTOLARYNGOLOGY | Facility: CLINIC | Age: 23
End: 2023-03-24

## 2023-03-24 ENCOUNTER — OFFICE VISIT (OUTPATIENT)
Dept: URGENT CARE | Facility: CLINIC | Age: 23
End: 2023-03-24
Payer: OTHER GOVERNMENT

## 2023-03-24 ENCOUNTER — TELEPHONE (OUTPATIENT)
Dept: OTOLARYNGOLOGY | Facility: CLINIC | Age: 23
End: 2023-03-24
Payer: OTHER GOVERNMENT

## 2023-03-24 VITALS
BODY MASS INDEX: 21.67 KG/M2 | HEIGHT: 72 IN | WEIGHT: 160 LBS | SYSTOLIC BLOOD PRESSURE: 117 MMHG | OXYGEN SATURATION: 95 % | RESPIRATION RATE: 16 BRPM | HEART RATE: 77 BPM | DIASTOLIC BLOOD PRESSURE: 53 MMHG | TEMPERATURE: 99 F

## 2023-03-24 DIAGNOSIS — J02.9 PHARYNGITIS, UNSPECIFIED ETIOLOGY: Primary | ICD-10-CM

## 2023-03-24 LAB
CTP QC/QA: YES
CTP QC/QA: YES
HETEROPH AB SER QL: NEGATIVE
MOLECULAR STREP A: NEGATIVE

## 2023-03-24 PROCEDURE — 87651 POCT STREP A MOLECULAR: ICD-10-PCS | Mod: QW,S$GLB,, | Performed by: NURSE PRACTITIONER

## 2023-03-24 PROCEDURE — 86308 HETEROPHILE ANTIBODY SCREEN: CPT | Mod: QW,S$GLB,, | Performed by: NURSE PRACTITIONER

## 2023-03-24 PROCEDURE — 87651 STREP A DNA AMP PROBE: CPT | Mod: QW,S$GLB,, | Performed by: NURSE PRACTITIONER

## 2023-03-24 PROCEDURE — 86308 POCT INFECTIOUS MONONUCLEOSIS: ICD-10-PCS | Mod: QW,S$GLB,, | Performed by: NURSE PRACTITIONER

## 2023-03-24 PROCEDURE — 87070 CULTURE OTHR SPECIMN AEROBIC: CPT | Performed by: NURSE PRACTITIONER

## 2023-03-24 PROCEDURE — 99214 OFFICE O/P EST MOD 30 MIN: CPT | Mod: S$GLB,,, | Performed by: NURSE PRACTITIONER

## 2023-03-24 PROCEDURE — 99214 PR OFFICE/OUTPT VISIT, EST, LEVL IV, 30-39 MIN: ICD-10-PCS | Mod: S$GLB,,, | Performed by: NURSE PRACTITIONER

## 2023-03-24 RX ORDER — CEFDINIR 300 MG/1
300 CAPSULE ORAL 2 TIMES DAILY
Qty: 20 CAPSULE | Refills: 0 | Status: SHIPPED | OUTPATIENT
Start: 2023-03-24 | End: 2023-04-03

## 2023-03-24 RX ORDER — PREDNISONE 20 MG/1
60 TABLET ORAL
Status: COMPLETED | OUTPATIENT
Start: 2023-03-24 | End: 2023-03-24

## 2023-03-24 RX ADMIN — PREDNISONE 60 MG: 20 TABLET ORAL at 12:03

## 2023-03-24 NOTE — TELEPHONE ENCOUNTER
----- Message from Karon Fatima sent at 3/24/2023 12:02 PM CDT -----  Contact: [phone]  Patient is currently in the office og [facility] and is requesting an urgent or same day appointment due to the patients condition. He has been treated for strep but his throat is in horrible condition. Please call him back at [phone]. Thanks

## 2023-03-24 NOTE — PROGRESS NOTES
Subjective:       Patient ID: Dameon Martinez is a 22 y.o. male.    Vitals:  height is 6' (1.829 m) and weight is 72.6 kg (160 lb). His tympanic temperature is 99.1 °F (37.3 °C). His blood pressure is 117/53 (abnormal) and his pulse is 77. His respiration is 16 and oxygen saturation is 95%.     Chief Complaint: Sore Throat (Positive for strep)    Patient presents with Sore throat. Tested positive for strep 4 days ago and states he is not getting any better. States he ran fever all night last night. Last tylenol 1.5 hrs ago. He is able to swallow his saliva but states when he lies down he does drool.     Sore Throat   This is a new problem. The current episode started in the past 7 days (5). The problem has been unchanged. Neither side of throat is experiencing more pain than the other. The maximum temperature recorded prior to his arrival was 102 - 102.9 F. The fever has been present for 3 to 4 days. The pain is at a severity of 9/10. The pain is severe. Associated symptoms include congestion, coughing, diarrhea, ear pain and headaches. Pertinent negatives include no abdominal pain, drooling, ear discharge, hoarse voice, plugged ear sensation, neck pain, shortness of breath, stridor, swollen glands, trouble swallowing or vomiting. He has had exposure to strep. He has had no exposure to mono. Treatments tried: precriptions for strep. The treatment provided no relief.     Constitution: Positive for sweating and fever.   HENT:  Positive for ear pain, congestion and sore throat. Negative for ear discharge, drooling and trouble swallowing.    Neck: Negative for neck pain.   Cardiovascular:  Negative for chest pain.   Respiratory:  Positive for cough. Negative for shortness of breath and stridor.    Gastrointestinal:  Positive for diarrhea. Negative for abdominal pain, nausea and vomiting.   Genitourinary:  Negative for dysuria.   Skin:  Negative for rash.   Allergic/Immunologic: Negative for immunocompromised state.    Neurological:  Positive for headaches. Negative for disorientation.   Hematologic/Lymphatic: Negative for easy bruising/bleeding. Does not bruise/bleed easily.   Psychiatric/Behavioral:  Negative for disorientation.      Objective:      Physical Exam   Constitutional: He is oriented to person, place, and time. He appears well-developed. He is cooperative.   HENT:   Head: Normocephalic and atraumatic.   Ears:   Right Ear: Hearing, tympanic membrane, external ear and ear canal normal.   Left Ear: Hearing, tympanic membrane, external ear and ear canal normal.   Nose: Nose normal. No mucosal edema or nasal deformity. No epistaxis. Right sinus exhibits no maxillary sinus tenderness and no frontal sinus tenderness. Left sinus exhibits no maxillary sinus tenderness and no frontal sinus tenderness.   Mouth/Throat: Uvula is midline and mucous membranes are normal. No trismus in the jaw. Normal dentition. No uvula swelling. Oropharyngeal exudate, posterior oropharyngeal edema and posterior oropharyngeal erythema present. Tonsils are 2+ on the right. Tonsils are 3+ on the left.   Eyes: Conjunctivae and lids are normal.   Neck: Trachea normal and phonation normal. Neck supple.   Cardiovascular: Normal rate, regular rhythm, normal heart sounds and normal pulses.   Pulmonary/Chest: Effort normal and breath sounds normal.   Abdominal: Normal appearance and bowel sounds are normal. He exhibits no distension. Soft. There is no abdominal tenderness.   Musculoskeletal: Normal range of motion.         General: Normal range of motion.   Neurological: He is alert and oriented to person, place, and time. He exhibits normal muscle tone.   Skin: Skin is warm, dry and intact.   Psychiatric: His speech is normal and behavior is normal. Judgment and thought content normal.   Nursing note and vitals reviewed.      Assessment:       1. Pharyngitis, unspecified etiology          Plan:         Pharyngitis, unspecified etiology  -     POCT Strep  A, Molecular  -     POCT Infectious mononucleosis antibody  -     CULTURE, RESPIRATORY  - THROAT  -     cefdinir (OMNICEF) 300 MG capsule; Take 1 capsule (300 mg total) by mouth 2 (two) times daily. for 10 days  Dispense: 20 capsule; Refill: 0    Other orders  -     predniSONE tablet 60 mg           Medical Decision Making:   History:   Old Records Summarized: records from clinic visits.       <> Summary of Records: Seen 3/20 given Z pack  RTC not better 3/22 given steroid and rocephin shot  Today still running fever and throat pain not improved.   Initial Assessment:   Continues fever, throat pain x 7 days  Differential Diagnosis:   Strep  Abscess  Mono  Clinical Tests:   Lab Tests: Ordered and Reviewed       <> Summary of Lab: Strep negative  Mono negative  Throat culture pending  Urgent Care Management:  60 mg oral prednisone  D/C clindamycin  Start omnicef    Appt with ENT for Monday AM. Request ENT visit for today. Message sent to ENT given patients number to schedule visit today if possible    Strict ED precautions discussed  Other:   I have discussed this case with another health care provider.       <> Summary of the Discussion: Spoke with Dr. Emma Rushing regarding case management. Patient advised that she will be at the [facility] this weekend and is aware of the case.     Monitor for any severe pain, difficulty swallowing liquids, unilateral severe tonsil swelling or severe neck stiffness/severe headache - warrants emergent re-evaluation.    If symptoms worsen or fail to improve, follow up with ENT or nearest ER. After visit summary given and discussed. Patient verbalized understanding and agrees with treatment plan. Patient remained stable and was discharged in no acute distress.

## 2023-03-24 NOTE — TELEPHONE ENCOUNTER
Returned call, no answer, lmom  that Dr. Harden can see him today at 2:15.[employer] message sent as well.

## 2023-03-25 ENCOUNTER — PATIENT MESSAGE (OUTPATIENT)
Dept: INTERNAL MEDICINE | Facility: CLINIC | Age: 23
End: 2023-03-25
Payer: OTHER GOVERNMENT

## 2023-03-25 DIAGNOSIS — R10.9 ABDOMINAL PAIN, UNSPECIFIED ABDOMINAL LOCATION: Primary | ICD-10-CM

## 2023-03-26 ENCOUNTER — TELEPHONE (OUTPATIENT)
Dept: URGENT CARE | Facility: CLINIC | Age: 23
End: 2023-03-26
Payer: OTHER GOVERNMENT

## 2023-03-26 NOTE — TELEPHONE ENCOUNTER
Called patient to review culture results.  He states he actually went to the emergency room yesterday.  They gave him a bag of fluids and another antibiotic shot.  States he is feeling a bit better today and the pain has subsided somewhat.  He is able to tolerate food and fluids at this time.

## 2023-03-27 LAB — BACTERIA THROAT CULT: NORMAL

## 2023-04-26 ENCOUNTER — PATIENT MESSAGE (OUTPATIENT)
Dept: INTERNAL MEDICINE | Facility: CLINIC | Age: 23
End: 2023-04-26
Payer: OTHER GOVERNMENT

## 2023-05-17 ENCOUNTER — TELEPHONE (OUTPATIENT)
Dept: GASTROENTEROLOGY | Facility: CLINIC | Age: 23
End: 2023-05-17
Payer: OTHER GOVERNMENT

## 2023-05-24 ENCOUNTER — TELEPHONE (OUTPATIENT)
Dept: GASTROENTEROLOGY | Facility: CLINIC | Age: 23
End: 2023-05-24
Payer: OTHER GOVERNMENT

## 2023-05-26 ENCOUNTER — TELEPHONE (OUTPATIENT)
Dept: GASTROENTEROLOGY | Facility: CLINIC | Age: 23
End: 2023-05-26
Payer: OTHER GOVERNMENT

## 2023-06-25 ENCOUNTER — OFFICE VISIT (OUTPATIENT)
Dept: URGENT CARE | Facility: CLINIC | Age: 23
End: 2023-06-25
Payer: OTHER GOVERNMENT

## 2023-06-25 VITALS
BODY MASS INDEX: 21.67 KG/M2 | HEIGHT: 72 IN | SYSTOLIC BLOOD PRESSURE: 121 MMHG | DIASTOLIC BLOOD PRESSURE: 61 MMHG | OXYGEN SATURATION: 98 % | RESPIRATION RATE: 20 BRPM | WEIGHT: 160 LBS | TEMPERATURE: 99 F | HEART RATE: 68 BPM

## 2023-06-25 DIAGNOSIS — J02.0 STREP PHARYNGITIS: Primary | ICD-10-CM

## 2023-06-25 DIAGNOSIS — J35.1 TONSILLAR HYPERTROPHY: ICD-10-CM

## 2023-06-25 DIAGNOSIS — J02.9 SORE THROAT: ICD-10-CM

## 2023-06-25 DIAGNOSIS — J35.1 ENLARGED TONSILS: ICD-10-CM

## 2023-06-25 LAB
CTP QC/QA: YES
MOLECULAR STREP A: POSITIVE

## 2023-06-25 PROCEDURE — 99213 PR OFFICE/OUTPT VISIT, EST, LEVL III, 20-29 MIN: ICD-10-PCS | Mod: 25,S$GLB,,

## 2023-06-25 PROCEDURE — 96372 THER/PROPH/DIAG INJ SC/IM: CPT | Mod: S$GLB,,,

## 2023-06-25 PROCEDURE — 87651 POCT STREP A MOLECULAR: ICD-10-PCS | Mod: QW,S$GLB,,

## 2023-06-25 PROCEDURE — 87651 STREP A DNA AMP PROBE: CPT | Mod: QW,S$GLB,,

## 2023-06-25 PROCEDURE — 99213 OFFICE O/P EST LOW 20 MIN: CPT | Mod: 25,S$GLB,,

## 2023-06-25 PROCEDURE — 96372 PR INJECTION,THERAP/PROPH/DIAG2ST, IM OR SUBCUT: ICD-10-PCS | Mod: S$GLB,,,

## 2023-06-25 RX ORDER — DEXAMETHASONE SODIUM PHOSPHATE 4 MG/ML
4 INJECTION, SOLUTION INTRA-ARTICULAR; INTRALESIONAL; INTRAMUSCULAR; INTRAVENOUS; SOFT TISSUE
Status: DISCONTINUED | OUTPATIENT
Start: 2023-06-25 | End: 2023-06-25

## 2023-06-25 RX ORDER — DEXAMETHASONE SODIUM PHOSPHATE 100 MG/10ML
10 INJECTION INTRAMUSCULAR; INTRAVENOUS
Status: COMPLETED | OUTPATIENT
Start: 2023-06-25 | End: 2023-06-25

## 2023-06-25 RX ORDER — CEFDINIR 300 MG/1
300 CAPSULE ORAL 2 TIMES DAILY
Qty: 20 CAPSULE | Refills: 0 | Status: SHIPPED | OUTPATIENT
Start: 2023-06-25 | End: 2023-07-05

## 2023-06-25 RX ADMIN — DEXAMETHASONE SODIUM PHOSPHATE 10 MG: 100 INJECTION INTRAMUSCULAR; INTRAVENOUS at 05:06

## 2023-06-25 NOTE — PROGRESS NOTES
Subjective:      Patient ID: Dameon Martinez is a 22 y.o. male.    Vitals:  height is 6' (1.829 m) and weight is 72.6 kg (160 lb). His temperature is 98.8 °F (37.1 °C). His blood pressure is 121/61 and his pulse is 68. His respiration is 20 and oxygen saturation is 98%.     Chief Complaint: Sore Throat    Dameon Martinez is a 22 y.o. male who presents for sore throat which onset 2 days ago. Associated sxs include pain with swallowing and swollen glands. Patient denies any fever, chills, SOB, CP, abd pain, n/v/d, rash, dizziness, or numbness/tingling. Prior Tx includes Ibuprofen. No known exposures. Recurrent tonsillitis.    Sore Throat   This is a new problem. The current episode started yesterday. The problem has been unchanged. There has been no fever. The pain is at a severity of 8/10. The pain is moderate. Associated symptoms include a hoarse voice and trouble swallowing. Pertinent negatives include no abdominal pain, congestion, coughing, diarrhea, drooling, ear discharge, ear pain, headaches, plugged ear sensation, neck pain, shortness of breath, stridor, swollen glands or vomiting. He has had no exposure to strep or mono. He has tried NSAIDs for the symptoms. The treatment provided no relief.     Constitution: Negative for appetite change, chills, sweating, fatigue and fever.   HENT:  Positive for sore throat and trouble swallowing. Negative for ear pain, ear discharge, foreign body in ear, hearing loss, drooling, congestion, postnasal drip, sinus pain and sinus pressure.    Neck: Negative for neck pain.   Cardiovascular:  Negative for chest pain.   Respiratory:  Negative for cough, sputum production, shortness of breath and stridor.    Gastrointestinal:  Negative for abdominal pain, nausea, vomiting and diarrhea.   Musculoskeletal:  Negative for muscle ache.   Neurological:  Negative for dizziness, headaches, numbness and tingling.    Objective:     Physical Exam   Constitutional: He is oriented to person,  place, and time. He appears well-developed. He is cooperative.  Non-toxic appearance. He does not appear ill. No distress.   HENT:   Head: Normocephalic and atraumatic.   Ears:   Right Ear: Hearing, tympanic membrane, external ear and ear canal normal.   Left Ear: Hearing, tympanic membrane, external ear and ear canal normal.   Nose: Nose normal. No mucosal edema or nasal deformity. No epistaxis. Right sinus exhibits no maxillary sinus tenderness and no frontal sinus tenderness. Left sinus exhibits no maxillary sinus tenderness and no frontal sinus tenderness.   Mouth/Throat: Uvula is midline and mucous membranes are normal. Mucous membranes are moist. No trismus in the jaw. Normal dentition. No uvula swelling. Posterior oropharyngeal erythema present. No oropharyngeal exudate or posterior oropharyngeal edema. Tonsils are 3+ on the right. Tonsils are 3+ on the left. Tonsillar exudate.      Comments: Airway patent.  Eyes: Conjunctivae and lids are normal. No scleral icterus. Extraocular movement intact   Neck: Trachea normal and phonation normal. Neck supple. No edema present. No erythema present. No neck rigidity present.   Cardiovascular: Normal rate, regular rhythm, normal heart sounds and normal pulses.   Pulmonary/Chest: Effort normal and breath sounds normal. No stridor. No respiratory distress. He has no decreased breath sounds. He has no wheezes. He has no rhonchi. He has no rales.   Abdominal: Normal appearance.   Musculoskeletal: Normal range of motion.         General: No deformity. Normal range of motion.   Lymphadenopathy:     He has cervical adenopathy (R).   Neurological: He is alert and oriented to person, place, and time. He exhibits normal muscle tone. Coordination normal.   Skin: Skin is warm, dry, intact, not diaphoretic and not pale.   Psychiatric: His speech is normal and behavior is normal. Judgment and thought content normal.   Nursing note and vitals reviewed.    Assessment:     1. Strep  pharyngitis    2. Sore throat    3. Tonsillar hypertrophy    4. Enlarged tonsils        Results for orders placed or performed in visit on 06/25/23   POCT Strep A, Molecular   Result Value Ref Range    Molecular Strep A, POC Positive (A) Negative     Acceptable Yes        Plan:       Strep pharyngitis  -     cefdinir (OMNICEF) 300 MG capsule; Take 1 capsule (300 mg total) by mouth 2 (two) times daily. for 10 days  Dispense: 20 capsule; Refill: 0    Sore throat  -     POCT Strep A, Molecular    Tonsillar hypertrophy  -     dexAMETHasone injection 10 mg    Enlarged tonsils  -     dexAMETHasone injection 10 mg    Afebrile. VSS. Patient is in NAD.  No evidence of PTA.  Reviewed positive strep test with patient who verbalized understanding.    Patient with recurrent strep and treatment failure.  Meds: Omnicef sent to preferred pharmacy.  Dexamethasone IM given in clinic. Discussed side effects of medication.  Discussed at home remedies and OTC medications to help with current symptoms.  Change toothbrush in 24 hours.  Avoid sharing drinks, utensils, or food with others.  Increase fluid intake. Plenty of rest.  Tylenol/Ibuprofen (if permitted) for any pain or discomfort.  Advised patient to follow up with ENT as soon as possible for tonsillectomy.  RTC for any worsening symptoms.  Patient exits exam room in no acute distress.

## 2023-06-25 NOTE — PATIENT INSTRUCTIONS
You received a steroid today. This can elevate your blood pressure, elevate your blood sugar, cause water weight gain, and cause anxiousness. If you received a steroid shot, this in addition may cause dimpling or thinning of the skin.     Strep Throat  If your condition worsens or fails to improve we recommend that you receive another evaluation at the ER immediately or return here. You must understand that you've received an urgent care treatment only and that you may be released before all your medical problems are known or treated. You the patient will arrange for followup care as instructed.   Your Rapid Strep Test was POSITIVE.  Cool liquids as much as possible.  Avoid any foods or beverages that may cause irritation to the throat (spicy, acidic, rough)  Tylenol or ibuprofen for fever or pain as directed.    Rest is important.   Complete full course of antibiotics.  Use a new toothbrush now and another new toothbrush after completion of antibiotics.  Follow up in the ER for any worsening of symptoms such as increase in throat pain, trouble breathing or speaking, shortness of breath, neck stiffness, ear pain, increased tiredness, ect.     Parents verbalizes understanding and agrees with plan of care.    Follow up with ENT as soon as possible.

## 2023-07-07 DIAGNOSIS — F41.1 GAD (GENERALIZED ANXIETY DISORDER): ICD-10-CM

## 2023-07-10 RX ORDER — ESCITALOPRAM OXALATE 20 MG/1
20 TABLET ORAL DAILY
Qty: 90 TABLET | Refills: 3 | Status: SHIPPED | OUTPATIENT
Start: 2023-07-10 | End: 2024-02-01 | Stop reason: SDUPTHER

## 2023-08-18 ENCOUNTER — OFFICE VISIT (OUTPATIENT)
Dept: OTOLARYNGOLOGY | Facility: CLINIC | Age: 23
End: 2023-08-18
Payer: OTHER GOVERNMENT

## 2023-08-18 VITALS — WEIGHT: 158.75 LBS | BODY MASS INDEX: 21.53 KG/M2

## 2023-08-18 DIAGNOSIS — J35.1 TONSILLAR HYPERTROPHY: ICD-10-CM

## 2023-08-18 DIAGNOSIS — J35.01 CHRONIC TONSILLITIS: Primary | ICD-10-CM

## 2023-08-18 PROCEDURE — 99999 PR PBB SHADOW E&M-EST. PATIENT-LVL III: ICD-10-PCS | Mod: PBBFAC,,, | Performed by: STUDENT IN AN ORGANIZED HEALTH CARE EDUCATION/TRAINING PROGRAM

## 2023-08-18 PROCEDURE — 99999 PR PBB SHADOW E&M-EST. PATIENT-LVL III: CPT | Mod: PBBFAC,,, | Performed by: STUDENT IN AN ORGANIZED HEALTH CARE EDUCATION/TRAINING PROGRAM

## 2023-08-18 PROCEDURE — 99213 OFFICE O/P EST LOW 20 MIN: CPT | Mod: S$PBB,,, | Performed by: STUDENT IN AN ORGANIZED HEALTH CARE EDUCATION/TRAINING PROGRAM

## 2023-08-18 PROCEDURE — 99213 PR OFFICE/OUTPT VISIT, EST, LEVL III, 20-29 MIN: ICD-10-PCS | Mod: S$PBB,,, | Performed by: STUDENT IN AN ORGANIZED HEALTH CARE EDUCATION/TRAINING PROGRAM

## 2023-08-18 PROCEDURE — 99213 OFFICE O/P EST LOW 20 MIN: CPT | Mod: PBBFAC | Performed by: STUDENT IN AN ORGANIZED HEALTH CARE EDUCATION/TRAINING PROGRAM

## 2023-08-18 NOTE — PROGRESS NOTES
Chief complaint:    Chief Complaint   Patient presents with    Other     Tonsils , swollen since feb , wants to discuss getting them removed            Referring Provider:  No referring provider defined for this encounter.      History of present illness:     Mr. Martinez is a 22 y.o. presenting for evaluation of chronic tonsillitis.     The patient reports tonsil symptoms including: sore throats, uncomfortable when swallowing, coughing, tonsil swelling which has not resolved, snoring; present all the time   Symptoms have been present for 4 months  There have been  episodes of pharyngitis/tonsillitis in the last year.   Severity of episodes: severe  Treatment has included: no antibiotics or steroids     The patient denies voice change, otalgia, unintentional weight loss or neck mass.    Update 2/2/23  Some improvement with sore throat, swallowing discomfort with steroids, but almost immediately recurred.     Now with nearly 5 months of almost continuous symptoms.     Update 8/18/23    Symptoms have not improved since last visit. Symptoms include sore throat, uncomfortable when swallowing, coughing, tonsil swelling which has not resolve. Near constant.    Also had another episode of strep since then. In between episodes symptoms do not resolve.     History      Past Medical History:   Past Medical History:   Diagnosis Date    Anxiety     Patient denies medical problems          Past Surgical History:  Past Surgical History:   Procedure Laterality Date    EXAMINATION UNDER ANESTHESIA Left 2/15/2019    Procedure: EXAM UNDER ANESTHESIA;  Surgeon: Pedro Hall MD;  Location: [facility] OR;  Service: Orthopedics;  Laterality: Left;    SHOULDER ARTHROSCOPY Left 2/15/2019    Procedure: ARTHROSCOPY, SHOULDER;  Surgeon: Pedro Hall MD;  Location: [facility] OR;  Service: Orthopedics;  Laterality: Left;    SHOULDER ARTHROSCOPY W/ SUPERIOR LABRAL ANTERIOR POSTERIOR LESION REPAIR Left 2/15/2019    Procedure: ARTHROSCOPY,  SHOULDER, WITH SLAP REPAIR;  Surgeon: Pedro Hall MD;  Location: [facility];  Service: Orthopedics;  Laterality: Left;         Medications: Medication list reviewed. He  has a current medication list which includes the following prescription(s): buspirone, cetirizine, and escitalopram oxalate.     Allergies:   Review of patient's allergies indicates:   Allergen Reactions    Penicillins          Family history: family history is not on file.         Social History          Alcohol use:  reports current alcohol use of about 1.0 standard drink of alcohol per week.            Tobacco:  reports that he has never smoked. He has never used smokeless tobacco.         Physical Examination      Vitals: Weight 72 kg (158 lb 11.7 oz).      General: Well developed, well nourished, well hydrated.     Voice: no dysphonia, no dysarthria      Head/Face: Normocephalic, atraumatic. No scars or lesions. Facial musculature equal.     Eyes: No scleral icterus or conjunctival hemorrhage. EOMI. PERRLA.     Ears:     Right ear: No gross deformity. EAC is clear of debris and erythema. TM are intact with a pneumatized middle ear. No signs of retraction, fluid or infection.      Left ear: No gross deformity. EAC is clear of debris and erythema. TM are intact with a pneumatized middle ear. No signs of retraction, fluid or infection.      Nose: No gross deformity or lesions. No purulent discharge. No significant NSD.      Mouth/Oropharynx: Lips without any lesions. No mucosal lesions within the oropharynx. Pharyngeal walls symmetrical. Uvula midline. Tongue midline without lesions. Tonsils are 3+ and symmetric with tonsilliths     Neck: Trachea midline. No masses. No thyromegaly or nodules palpated.     Lymphatic: No lymphadenopathy in the neck.     Extremities: No cyanosis. Warm and well-perfused.     Skin: No scars or lesions on face or neck.      Neurologic: Moving all extremities without gross abnormality.CN II-XII grossly intact.  House-Brackmann 1/6. No signs of nystagmus.          Data reviewed      Review of records:      I reviewed records from the referring provider's office visits including the history, workup, and/or treatment of this problem thus far.    Labs reviewed    Component Ref Range & Units 9 mo ago    Isai-Barr Virus IgG (VCA) Negative Positive Abnormal       Molecular Strep A, POC Negative Negative           Acceptable  Yes            POC Molecular Influenza A Ag Negative, Not Reported Negative          POC Molecular Influenza B Ag Negative, Not Reported Negative           Acceptable  Yes              Assessment/Plan:    No diagnosis found.         Dameon has chronic tonsillitis developed after acute infection about 4 months ago.  We discussed conservative measures during infections vs proceeding with tonsillectomy.     We discussed treating first with a round of steroids and antibiotics. If this does not make a significant impact on his symptoms or tonsillar hypertrophy then will proceed with tonsillectomy.      The risks and benefits of surgery were discussed at length including, but not limited to, respiratory distress, hemorrhage, regurgitation of food/liquids into the nasopharynx, voice changes, dehydration and pain.  The patient had the opportunity to ask questions to their satisfaction.     Update 8/18/23  Persistent symptoms despite medical therapy. Despite risks he elected to proceed with tonsillectomy. He will call with surgical date. Risks and benefits again discussed, as noted above.                     Prabhakar Harden MD  [facility]   [facility] [address]  [place] [address]  P: [phone]  F: [phone]

## 2023-08-29 DIAGNOSIS — Z01.818 PRE-OP TESTING: Primary | ICD-10-CM

## 2023-10-02 ENCOUNTER — PATIENT MESSAGE (OUTPATIENT)
Dept: PREADMISSION TESTING | Facility: HOSPITAL | Age: 23
End: 2023-10-02
Payer: OTHER GOVERNMENT

## 2023-10-02 ENCOUNTER — ANESTHESIA EVENT (OUTPATIENT)
Dept: SURGERY | Facility: HOSPITAL | Age: 23
End: 2023-10-02
Payer: OTHER GOVERNMENT

## 2023-10-02 ENCOUNTER — PATIENT MESSAGE (OUTPATIENT)
Dept: OTOLARYNGOLOGY | Facility: CLINIC | Age: 23
End: 2023-10-02
Payer: OTHER GOVERNMENT

## 2023-10-02 ENCOUNTER — TELEPHONE (OUTPATIENT)
Dept: OTOLARYNGOLOGY | Facility: CLINIC | Age: 23
End: 2023-10-02
Payer: OTHER GOVERNMENT

## 2023-10-02 NOTE — ANESTHESIA PREPROCEDURE EVALUATION
10/02/2023  Dameon Martinez is a 22 y.o., male.  Past Medical History:   Diagnosis Date    Anxiety     Patient denies medical problems      Past Surgical History:   Procedure Laterality Date    EXAMINATION UNDER ANESTHESIA Left 2/15/2019    Procedure: EXAM UNDER ANESTHESIA;  Surgeon: Pedro Hall MD;  Location: [facility];  Service: Orthopedics;  Laterality: Left;    SHOULDER ARTHROSCOPY Left 2/15/2019    Procedure: ARTHROSCOPY, SHOULDER;  Surgeon: Pedro Hall MD;  Location: [facility] OR;  Service: Orthopedics;  Laterality: Left;    SHOULDER ARTHROSCOPY W/ SUPERIOR LABRAL ANTERIOR POSTERIOR LESION REPAIR Left 2/15/2019    Procedure: ARTHROSCOPY, SHOULDER, WITH SLAP REPAIR;  Surgeon: Pedro Hall MD;  Location: [facility] OR;  Service: Orthopedics;  Laterality: Left;         Pre-op Assessment    I have reviewed the Patient Summary Reports.    I have reviewed the NPO Status.   I have reviewed the Medications.     Review of Systems  Anesthesia Hx:  No problems with previous Anesthesia  History of prior surgery of interest to airway management or planning: Previous anesthesia: General   Social:  Non-Smoker    EENT/Dental:   chronic allergic rhinitis   Cardiovascular:  Cardiovascular Normal     Pulmonary:  Pulmonary Normal    Renal/:  Renal/ Normal     Hepatic/GI:  Hepatic/GI Normal    Endocrine:  Endocrine Normal    Psych:   anxiety          Physical Exam  General: Alert and Oriented    Airway:  Mallampati: II   Mouth Opening: Normal  TM Distance: Normal  Tongue: Normal  Neck ROM: Normal ROM    Dental:  Intact    Chest/Lungs:  Clear to auscultation, Normal Respiratory Rate    Heart:  Rate: Normal  Rhythm: Regular Rhythm        Anesthesia Plan  Type of Anesthesia, risks & benefits discussed:    Anesthesia Type: Gen ETT  Intra-op Monitoring Plan: Standard ASA Monitors  Post Op Pain Control Plan:  multimodal analgesia  Induction:  IV  Informed Consent: Informed consent signed with the Patient representative and all parties understand the risks and agree with anesthesia plan.  All questions answered. Patient consented to blood products? No  ASA Score: 2  Day of Surgery Review of History & Physical: H&P Update referred to the surgeon/provider.    Ready For Surgery From Anesthesia Perspective.     .

## 2023-10-03 ENCOUNTER — LAB VISIT (OUTPATIENT)
Dept: LAB | Facility: HOSPITAL | Age: 23
End: 2023-10-03
Payer: OTHER GOVERNMENT

## 2023-10-03 ENCOUNTER — TELEPHONE (OUTPATIENT)
Dept: PREADMISSION TESTING | Facility: HOSPITAL | Age: 23
End: 2023-10-03
Payer: OTHER GOVERNMENT

## 2023-10-03 DIAGNOSIS — Z01.818 PRE-OP TESTING: ICD-10-CM

## 2023-10-03 PROCEDURE — 85027 COMPLETE CBC AUTOMATED: CPT | Performed by: PHYSICIAN ASSISTANT

## 2023-10-03 PROCEDURE — 36415 COLL VENOUS BLD VENIPUNCTURE: CPT | Performed by: PHYSICIAN ASSISTANT

## 2023-10-03 PROCEDURE — 80053 COMPREHEN METABOLIC PANEL: CPT | Performed by: PHYSICIAN ASSISTANT

## 2023-10-04 ENCOUNTER — ANESTHESIA (OUTPATIENT)
Dept: SURGERY | Facility: HOSPITAL | Age: 23
End: 2023-10-04
Payer: OTHER GOVERNMENT

## 2023-10-04 ENCOUNTER — HOSPITAL ENCOUNTER (OUTPATIENT)
Facility: HOSPITAL | Age: 23
Discharge: HOME | End: 2023-10-04
Attending: STUDENT IN AN ORGANIZED HEALTH CARE EDUCATION/TRAINING PROGRAM | Admitting: STUDENT IN AN ORGANIZED HEALTH CARE EDUCATION/TRAINING PROGRAM
Payer: OTHER GOVERNMENT

## 2023-10-04 VITALS
SYSTOLIC BLOOD PRESSURE: 107 MMHG | TEMPERATURE: 98 F | WEIGHT: 158.31 LBS | BODY MASS INDEX: 21.44 KG/M2 | OXYGEN SATURATION: 98 % | HEART RATE: 49 BPM | DIASTOLIC BLOOD PRESSURE: 59 MMHG | HEIGHT: 72 IN | RESPIRATION RATE: 13 BRPM

## 2023-10-04 DIAGNOSIS — J35.01 CHRONIC TONSILLITIS: ICD-10-CM

## 2023-10-04 DIAGNOSIS — J35.1 TONSILLAR HYPERTROPHY: Primary | ICD-10-CM

## 2023-10-04 LAB
ALBUMIN SERPL BCP-MCNC: 4.3 G/DL (ref 3.5–5.2)
ALP SERPL-CCNC: 67 U/L (ref 55–135)
ALT SERPL W/O P-5'-P-CCNC: 21 U/L (ref 10–44)
ANION GAP SERPL CALC-SCNC: 6 MMOL/L (ref 8–16)
AST SERPL-CCNC: 22 U/L (ref 10–40)
BILIRUB SERPL-MCNC: 0.3 MG/DL (ref 0.1–1)
BUN SERPL-MCNC: 16 MG/DL (ref 6–20)
CALCIUM SERPL-MCNC: 9.6 MG/DL (ref 8.7–10.5)
CHLORIDE SERPL-SCNC: 104 MMOL/L (ref 95–110)
CO2 SERPL-SCNC: 29 MMOL/L (ref 23–29)
CREAT SERPL-MCNC: 1.1 MG/DL (ref 0.5–1.4)
ERYTHROCYTE [DISTWIDTH] IN BLOOD BY AUTOMATED COUNT: 12.4 % (ref 11.5–14.5)
EST. GFR  (NO RACE VARIABLE): >60 ML/MIN/1.73 M^2
GLUCOSE SERPL-MCNC: 102 MG/DL (ref 70–110)
HCT VFR BLD AUTO: 45.1 % (ref 40–54)
HGB BLD-MCNC: 14.8 G/DL (ref 14–18)
MCH RBC QN AUTO: 30.3 PG (ref 27–31)
MCHC RBC AUTO-ENTMCNC: 32.8 G/DL (ref 32–36)
MCV RBC AUTO: 92 FL (ref 82–98)
PLATELET # BLD AUTO: 328 K/UL (ref 150–450)
PMV BLD AUTO: 9.6 FL (ref 9.2–12.9)
POTASSIUM SERPL-SCNC: 4.5 MMOL/L (ref 3.5–5.1)
PROT SERPL-MCNC: 7.6 G/DL (ref 6–8.4)
RBC # BLD AUTO: 4.88 M/UL (ref 4.6–6.2)
SODIUM SERPL-SCNC: 139 MMOL/L (ref 136–145)
WBC # BLD AUTO: 7.53 K/UL (ref 3.9–12.7)

## 2023-10-04 PROCEDURE — 42826 PR REMOVAL OF TONSILS,12+ Y/O: ICD-10-PCS | Mod: ,,, | Performed by: STUDENT IN AN ORGANIZED HEALTH CARE EDUCATION/TRAINING PROGRAM

## 2023-10-04 PROCEDURE — 25000003 PHARM REV CODE 250: Performed by: NURSE ANESTHETIST, CERTIFIED REGISTERED

## 2023-10-04 PROCEDURE — 88304 TISSUE EXAM BY PATHOLOGIST: CPT | Mod: 26,,, | Performed by: STUDENT IN AN ORGANIZED HEALTH CARE EDUCATION/TRAINING PROGRAM

## 2023-10-04 PROCEDURE — 63600175 PHARM REV CODE 636 W HCPCS: Performed by: STUDENT IN AN ORGANIZED HEALTH CARE EDUCATION/TRAINING PROGRAM

## 2023-10-04 PROCEDURE — D9220A PRA ANESTHESIA: ICD-10-PCS | Mod: ,,, | Performed by: NURSE ANESTHETIST, CERTIFIED REGISTERED

## 2023-10-04 PROCEDURE — 88304 PR  SURG PATH,LEVEL III: ICD-10-PCS | Mod: 26,,, | Performed by: STUDENT IN AN ORGANIZED HEALTH CARE EDUCATION/TRAINING PROGRAM

## 2023-10-04 PROCEDURE — 36000706: Performed by: STUDENT IN AN ORGANIZED HEALTH CARE EDUCATION/TRAINING PROGRAM

## 2023-10-04 PROCEDURE — D9220A PRA ANESTHESIA: Mod: ,,, | Performed by: NURSE ANESTHETIST, CERTIFIED REGISTERED

## 2023-10-04 PROCEDURE — 88304 TISSUE EXAM BY PATHOLOGIST: CPT | Performed by: STUDENT IN AN ORGANIZED HEALTH CARE EDUCATION/TRAINING PROGRAM

## 2023-10-04 PROCEDURE — 71000015 HC POSTOP RECOV 1ST HR: Performed by: STUDENT IN AN ORGANIZED HEALTH CARE EDUCATION/TRAINING PROGRAM

## 2023-10-04 PROCEDURE — 00170 ANES INTRAORAL PX NOS: CPT | Performed by: STUDENT IN AN ORGANIZED HEALTH CARE EDUCATION/TRAINING PROGRAM

## 2023-10-04 PROCEDURE — 42826 REMOVAL OF TONSILS: CPT | Mod: ,,, | Performed by: STUDENT IN AN ORGANIZED HEALTH CARE EDUCATION/TRAINING PROGRAM

## 2023-10-04 PROCEDURE — 37000008 HC ANESTHESIA 1ST 15 MINUTES: Performed by: STUDENT IN AN ORGANIZED HEALTH CARE EDUCATION/TRAINING PROGRAM

## 2023-10-04 PROCEDURE — 71000033 HC RECOVERY, INTIAL HOUR: Performed by: STUDENT IN AN ORGANIZED HEALTH CARE EDUCATION/TRAINING PROGRAM

## 2023-10-04 PROCEDURE — 37000009 HC ANESTHESIA EA ADD 15 MINS: Performed by: STUDENT IN AN ORGANIZED HEALTH CARE EDUCATION/TRAINING PROGRAM

## 2023-10-04 PROCEDURE — 36000707: Performed by: STUDENT IN AN ORGANIZED HEALTH CARE EDUCATION/TRAINING PROGRAM

## 2023-10-04 PROCEDURE — 63600175 PHARM REV CODE 636 W HCPCS: Performed by: NURSE ANESTHETIST, CERTIFIED REGISTERED

## 2023-10-04 RX ORDER — DEXAMETHASONE 6 MG/1
TABLET ORAL
Qty: 4 TABLET | Refills: 0 | Status: SHIPPED | OUTPATIENT
Start: 2023-10-04 | End: 2023-10-04 | Stop reason: SDUPTHER

## 2023-10-04 RX ORDER — ONDANSETRON 2 MG/ML
INJECTION INTRAMUSCULAR; INTRAVENOUS
Status: DISCONTINUED | OUTPATIENT
Start: 2023-10-04 | End: 2023-10-04

## 2023-10-04 RX ORDER — FENTANYL CITRATE 50 UG/ML
25 INJECTION, SOLUTION INTRAMUSCULAR; INTRAVENOUS EVERY 5 MIN PRN
Status: DISCONTINUED | OUTPATIENT
Start: 2023-10-04 | End: 2023-10-04 | Stop reason: HOSPADM

## 2023-10-04 RX ORDER — DEXAMETHASONE SODIUM PHOSPHATE 4 MG/ML
INJECTION, SOLUTION INTRA-ARTICULAR; INTRALESIONAL; INTRAMUSCULAR; INTRAVENOUS; SOFT TISSUE
Status: DISCONTINUED | OUTPATIENT
Start: 2023-10-04 | End: 2023-10-04

## 2023-10-04 RX ORDER — SODIUM CHLORIDE, SODIUM LACTATE, POTASSIUM CHLORIDE, CALCIUM CHLORIDE 600; 310; 30; 20 MG/100ML; MG/100ML; MG/100ML; MG/100ML
INJECTION, SOLUTION INTRAVENOUS CONTINUOUS
Status: DISCONTINUED | OUTPATIENT
Start: 2023-10-04 | End: 2023-10-04 | Stop reason: HOSPADM

## 2023-10-04 RX ORDER — LIDOCAINE HYDROCHLORIDE 20 MG/ML
INJECTION INTRAVENOUS
Status: DISCONTINUED | OUTPATIENT
Start: 2023-10-04 | End: 2023-10-04

## 2023-10-04 RX ORDER — OXYCODONE HYDROCHLORIDE 5 MG/1
5 TABLET ORAL EVERY 4 HOURS PRN
Qty: 40 TABLET | Refills: 0 | Status: SHIPPED | OUTPATIENT
Start: 2023-10-04 | End: 2023-10-04 | Stop reason: SDUPTHER

## 2023-10-04 RX ORDER — DEXMEDETOMIDINE HYDROCHLORIDE 100 UG/ML
INJECTION, SOLUTION INTRAVENOUS
Status: DISCONTINUED | OUTPATIENT
Start: 2023-10-04 | End: 2023-10-04

## 2023-10-04 RX ORDER — ACETAMINOPHEN 10 MG/ML
INJECTION, SOLUTION INTRAVENOUS
Status: DISCONTINUED | OUTPATIENT
Start: 2023-10-04 | End: 2023-10-04

## 2023-10-04 RX ORDER — OXYCODONE HYDROCHLORIDE 5 MG/1
5 TABLET ORAL EVERY 4 HOURS PRN
Qty: 40 TABLET | Refills: 0 | Status: SHIPPED | OUTPATIENT
Start: 2023-10-04 | End: 2023-10-25

## 2023-10-04 RX ORDER — ONDANSETRON 2 MG/ML
4 INJECTION INTRAMUSCULAR; INTRAVENOUS ONCE
Status: DISCONTINUED | OUTPATIENT
Start: 2023-10-04 | End: 2023-10-04 | Stop reason: HOSPADM

## 2023-10-04 RX ORDER — DEXAMETHASONE 6 MG/1
TABLET ORAL
Qty: 4 TABLET | Refills: 0 | Status: SHIPPED | OUTPATIENT
Start: 2023-10-04 | End: 2023-10-25

## 2023-10-04 RX ORDER — FENTANYL CITRATE 50 UG/ML
INJECTION, SOLUTION INTRAMUSCULAR; INTRAVENOUS
Status: DISCONTINUED | OUTPATIENT
Start: 2023-10-04 | End: 2023-10-04

## 2023-10-04 RX ORDER — SUCCINYLCHOLINE CHLORIDE 20 MG/ML
INJECTION INTRAMUSCULAR; INTRAVENOUS
Status: DISCONTINUED | OUTPATIENT
Start: 2023-10-04 | End: 2023-10-04

## 2023-10-04 RX ORDER — PROPOFOL 10 MG/ML
VIAL (ML) INTRAVENOUS
Status: DISCONTINUED | OUTPATIENT
Start: 2023-10-04 | End: 2023-10-04

## 2023-10-04 RX ORDER — MEPERIDINE HYDROCHLORIDE 25 MG/ML
12.5 INJECTION INTRAMUSCULAR; INTRAVENOUS; SUBCUTANEOUS ONCE
Status: DISCONTINUED | OUTPATIENT
Start: 2023-10-04 | End: 2023-10-04 | Stop reason: HOSPADM

## 2023-10-04 RX ORDER — MIDAZOLAM HYDROCHLORIDE 1 MG/ML
INJECTION INTRAMUSCULAR; INTRAVENOUS
Status: DISCONTINUED | OUTPATIENT
Start: 2023-10-04 | End: 2023-10-04

## 2023-10-04 RX ADMIN — FENTANYL CITRATE 50 MCG: 50 INJECTION, SOLUTION INTRAMUSCULAR; INTRAVENOUS at 09:10

## 2023-10-04 RX ADMIN — SODIUM CHLORIDE, POTASSIUM CHLORIDE, SODIUM LACTATE AND CALCIUM CHLORIDE: 600; 310; 30; 20 INJECTION, SOLUTION INTRAVENOUS at 07:10

## 2023-10-04 RX ADMIN — DEXAMETHASONE SODIUM PHOSPHATE 8 MG: 4 INJECTION, SOLUTION INTRA-ARTICULAR; INTRALESIONAL; INTRAMUSCULAR; INTRAVENOUS; SOFT TISSUE at 10:10

## 2023-10-04 RX ADMIN — PROPOFOL 50 MG: 10 INJECTION, EMULSION INTRAVENOUS at 10:10

## 2023-10-04 RX ADMIN — DEXMEDETOMIDINE HYDROCHLORIDE 8 MCG: 100 INJECTION, SOLUTION INTRAVENOUS at 10:10

## 2023-10-04 RX ADMIN — SUCCINYLCHOLINE CHLORIDE 100 MG: 20 INJECTION, SOLUTION INTRAMUSCULAR; INTRAVENOUS; PARENTERAL at 10:10

## 2023-10-04 RX ADMIN — MIDAZOLAM HYDROCHLORIDE 2 MG: 1 INJECTION, SOLUTION INTRAMUSCULAR; INTRAVENOUS at 09:10

## 2023-10-04 RX ADMIN — ONDANSETRON 4 MG: 2 INJECTION INTRAMUSCULAR; INTRAVENOUS at 10:10

## 2023-10-04 RX ADMIN — PROPOFOL 200 MG: 10 INJECTION, EMULSION INTRAVENOUS at 10:10

## 2023-10-04 RX ADMIN — ACETAMINOPHEN 1000 MG: 10 INJECTION, SOLUTION INTRAVENOUS at 10:10

## 2023-10-04 RX ADMIN — LIDOCAINE HYDROCHLORIDE 75 MG: 20 INJECTION INTRAVENOUS at 10:10

## 2023-10-04 RX ADMIN — FENTANYL CITRATE 50 MCG: 50 INJECTION, SOLUTION INTRAMUSCULAR; INTRAVENOUS at 10:10

## 2023-10-04 NOTE — OP NOTE
DATE OF PROCEDURE:  10/04/2023      PRE-OPERATIVE DIAGNOSIS:   Chronic tonsillitis  Tonsillar hypertrophy     POST-OPERATIVE DIAGNOSIS:   same      PROCEDURE:   tonsillectomy      SURGEON:   Prabhakar Harden MD     ANESTHESIA:   General      ESTIMATED BLOOD LOSS:   10 mL      SPECIMENS:   Left tonsil  Right tonsil       COMPLICATIONS:   None apparent     INDICATIONS:    Dameon Martinez is a 22 y.o. male with chronic tonsillitis who presents today for tonsillectomy. Risks, benefits, and expectations were thoroughly discussed and the patient/patient's family wished to proceed. Informed consent was obtained. All questions were answered.       OPERATIVE FINDINGS:   3+/4 palatine tonsils that were chronically inflamed with purulent debris       OPERATIVE DETAILS:   After being properly identified in the preoperative holding area, the patient was brought into the operating suite.  The patient was placed supine on the operating table.  A pre-procedural time-out was performed. Pre-operative antibiotics and steroids were administered. After induction of general anesthesia, the patient was successfully intubated with confirmation of tube placement via CO2 return.  The bed was then turned 90 degrees and the patient was prepped and draped in the usual fashion for this procedure.      The mouth was held open in suspension using a Luis-Terry mouth gag and Templeton Suspension Arm. The right tonsil was grasped using a curved Allis and removed in a capsular plane using the Bovie set on 20 coag, 20 cut. The left tonsil was removed in a similar fashion. Small areas of bleeding and visible blood vessels were coagulate with the suction bovie at coag 35. The operative field was then irrigated and meticulously checked for hemostasis. The Luis-Terry was released and a flexible suction was used to remove stomach and oropharynx contents. The patient was resuspended and the tonsil fossa were rechecked for hemostasis. There was no bleeding.  The patient tolerated the procedure well.      With the surgical portion of the procedure complete, all instrumentation was then removed from the operative field. The patient's skin was cleaned.  The patient was returned to the care of the anesthesia team.  The patient was then weaned from his anesthetic and transported to the PACU in stable condition.

## 2023-10-04 NOTE — H&P
Day of Surgery History & Physical Exam    Patient Name: Dameon Martinez     Date: 10/4/2023          HPI: Dameon is a 22 y.o. male who presents today for operative treatment of   1. Tonsillar hypertrophy    2. Chronic tonsillitis    . No recent illnesses.          ROS:    A complete review of systems was obtained and is otherwise negative.       PMH:      Past Medical History:   Diagnosis Date    Anxiety     Chronic tonsillitis 10/4/2023    Patient denies medical problems           PSH:      Past Surgical History:   Procedure Laterality Date    EXAMINATION UNDER ANESTHESIA Left 2/15/2019    Procedure: EXAM UNDER ANESTHESIA;  Surgeon: Pedro Hall MD;  Location: [facility] OR;  Service: Orthopedics;  Laterality: Left;    SHOULDER ARTHROSCOPY Left 2/15/2019    Procedure: ARTHROSCOPY, SHOULDER;  Surgeon: Pedro Hall MD;  Location: [facility] OR;  Service: Orthopedics;  Laterality: Left;    SHOULDER ARTHROSCOPY W/ SUPERIOR LABRAL ANTERIOR POSTERIOR LESION REPAIR Left 2/15/2019    Procedure: ARTHROSCOPY, SHOULDER, WITH SLAP REPAIR;  Surgeon: Pedro Hall MD;  Location: [facility] OR;  Service: Orthopedics;  Laterality: Left;          MEDS:        Current Facility-Administered Medications:     lactated ringers infusion, , Intravenous, Continuous, Prabhakar Harden MD, Last Rate: 10 mL/hr at 10/04/23 0731, New Bag at 10/04/23 0731       ALLERGIES:     Penicillins       EXAM:     Vitals: /81 (BP Location: Right arm, Patient Position: Sitting)   Pulse (!) 51   Temp 98.4 °F (36.9 °C) (Temporal)   Resp 20   Ht 6' (1.829 m)   Wt 71.8 kg (158 lb 4.6 oz)   SpO2 100%   BMI 21.47 kg/m²      General: Awake, at baseline alertness.      HEENT: No scleral icterus or conjunctival hemorrhage. Globe position appears normal. External ears  normal. Nose patent without rhinorrhea. No lymphadenopathy. No thyromegaly     Cardiovascular: No cyanosis.     Pulmonary: No audible stridor. Breathing easily with no labor.     Neuro:  Symmetric facial movement.      Psychiatry: Appropriate affect and mood.     Skin: No scars or lesions on face or neck.     Extremities: Moves all extremities with normal range of motion.      Other Findings: None.       Assessment & Plan: Dameon has diagnoses of   1. Tonsillar hypertrophy    2. Chronic tonsillitis     and will go to the OR today for tonsillectomy. Informed consent was obtained in clinic and available in the EMR today. All questions have been answered.

## 2023-10-04 NOTE — ANESTHESIA PROCEDURE NOTES
Intubation    Date/Time: 10/4/2023 10:01 AM    Performed by: Nhung Gustafson CRNA  Authorized by: Charlotte Xiao MD    Intubation:     Induction:  Intravenous    Intubated:  Postinduction    Mask Ventilation:  Easy mask    Attempts:  1    Attempted By:  CRNA    Method of Intubation:  Direct    Blade:  Clancy 2    Laryngeal View Grade: Grade I - full view of cords      Difficult Airway Encountered?: No      Complications:  None    Airway Device:  Oral endotracheal tube    Airway Device Size:  7.5    Style/Cuff Inflation:  Cuffed (inflated to minimal occlusive pressure)    Tube secured:  22    Secured at:  The lips    Placement Verified By:  Capnometry    Complicating Factors:  Anterior larynx    Findings Post-Intubation:  BS equal bilateral

## 2023-10-04 NOTE — BRIEF OP NOTE
[facility]  Brief Operative Note     SUMMARY     Surgery Date: 10/4/2023     Surgeon(s) and Role:     * Prabhakar Harden MD - Primary    Assisting Surgeon: None    Pre-op Diagnosis:  Chronic tonsillitis [J35.01]  Tonsillar hypertrophy [J35.1]    Post-op Diagnosis:  Post-Op Diagnosis Codes:     * Chronic tonsillitis [J35.01]     * Tonsillar hypertrophy [J35.1]    Procedure(s) (LRB):  TONSILLECTOMY (Bilateral)    Anesthesia: General    Findings/Key Components:  see op note    Estimated Blood Loss: 10 mL           Specimens:   Specimen (24h ago, onward)       Start     Ordered    10/04/23 1016  Specimen to Pathology, Surgery ENT  Once        Comments: Pre-op Diagnosis: Chronic tonsillitis [J35.01]Tonsillar hypertrophy [J35.1]Procedure(s):TONSILLECTOMY Number of specimens: 2Name of specimens: 1. Right tonsil - PERM2. Left tonsil - PERM     References:    Click here for ordering Quick Tip   Question Answer Comment   Procedure Type: ENT    Specimen Class: Routine/Screening    Which provider would you like to cc? PRABHAKAR HARDEN    Release to patient Immediate        10/04/23 1015                    Discharge Note    SUMMARY     Admit Date: 10/4/2023    Discharge Date and Time: No discharge date for patient encounter.    Attending Physician: Prabhakar Harden MD     Discharge Provider: Prabhakar Harden    Final Diagnosis: Post-Op Diagnosis Codes:     * Chronic tonsillitis [J35.01]     * Tonsillar hypertrophy [J35.1]    Disposition: Home or Self Care, discharged in good condition    Follow Up/Patient Instructions:       Medications:  Reconciled Home Medications:   Current Discharge Medication List        START taking these medications    Details   dexAMETHasone (DECADRON) 6 MG tablet Take one pill every other morning starting the day after surgery.  Qty: 4 tablet, Refills: 0      oxyCODONE (ROXICODONE) 5 MG immediate release tablet Take 1 tablet (5 mg total) by mouth every 4 (four) hours as needed for  Pain.  Qty: 40 tablet, Refills: 0    Comments: Quantity prescribed more than 7 day supply? No.           CONTINUE these medications which have NOT CHANGED    Details   busPIRone (BUSPAR) 10 MG tablet Take 1 tablet (10 mg total) by mouth 3 (three) times daily.  Qty: 270 tablet, Refills: 0    Associated Diagnoses: YASIR (generalized anxiety disorder)      cetirizine (ZYRTEC) 5 MG tablet Take 5 mg by mouth 2 (two) times daily as needed.       EScitalopram oxalate (LEXAPRO) 20 MG tablet Take 1 tablet (20 mg total) by mouth once daily.  Qty: 90 tablet, Refills: 3    Associated Diagnoses: YASIR (generalized anxiety disorder)           No discharge procedures on file.

## 2023-10-04 NOTE — TRANSFER OF CARE
Anesthesia Transfer of Care Note    Patient: Dameon Martinez    Procedure(s) Performed: Procedure(s) (LRB):  TONSILLECTOMY (Bilateral)    Patient location: PACU    Anesthesia Type: general    Transport from OR: Transported from OR on room air with adequate spontaneous ventilation    Post pain: adequate analgesia    Post assessment: no apparent anesthetic complications and tolerated procedure well    Post vital signs: stable    Level of consciousness: awake    Nausea/Vomiting: no nausea/vomiting    Complications: none    Transfer of care protocol was followed      Last vitals:   Visit Vitals  /81 (BP Location: Right arm, Patient Position: Sitting)   Pulse (!) 51   Temp 36.9 °C (98.4 °F) (Temporal)   Resp 20   Ht 6' (1.829 m)   Wt 71.8 kg (158 lb 4.6 oz)   SpO2 100%   BMI 21.47 kg/m²

## 2023-10-04 NOTE — ANESTHESIA POSTPROCEDURE EVALUATION
Anesthesia Post Evaluation    Patient: Dameon Martinez    Procedure(s) Performed: Procedure(s) (LRB):  TONSILLECTOMY (Bilateral)    Final Anesthesia Type: general      Patient location during evaluation: PACU  Patient participation: Yes- Able to Participate  Level of consciousness: awake and alert and oriented  Post-procedure vital signs: reviewed and stable  Pain management: adequate  Airway patency: patent    PONV status at discharge: No PONV  Anesthetic complications: no      Cardiovascular status: blood pressure returned to baseline, stable and hemodynamically stable  Respiratory status: unassisted  Hydration status: euvolemic  Follow-up not needed.          Vitals Value Taken Time   /56 10/04/23 1114   Temp 36.7 °C (98.1 °F) 10/04/23 1033   Pulse 59 10/04/23 1114   Resp 14 10/04/23 1114   SpO2 97 % 10/04/23 1114   Vitals shown include unvalidated device data.      Event Time   Out of Recovery 11:10:00         Pain/Eli Score: Eli Score: 10 (10/4/2023 11:10 AM)

## 2023-10-04 NOTE — DISCHARGE INSTRUCTIONS
Adult Tonsillectomy Discharge Instructions    -Throat pain is greater the first few days after surgery, and may last up to two weeks. It may be severe at first. It is very important to stay well hydrated and ahead of the pain.  - staying hydrated is critical to avoiding dehydration and keeping the pain controlled. It will also help reduce the risk of bleeding. Drink small sips throughout the day. You will not have your usual appetite for up to two weeks after surgery, and this is normal. However, the key to recovery is to stay hydrated.  - No travel for 2 weeks.    - Call if poor drinking, bleeding, persistent fever >101.5 more than 1 day after surgery, or other concerning symptoms.     Activity/Restrictions:    - Avoid heavy lifting, straining or strenuous activities until instructed otherwise     Diet:   - Eat soft foods for 2 weeks, and nothing with sharp edges such as chips or pretzels. Spicy, citrus, or tomato-based foods may burn as well.     Pain Instructions:   - Alternate using acetaminophen/Tylenol and ibuprofen/Motrin every 3 hours for the first several days to control your pain. If this does not bring you relief or the pain remains severe, a stronger pain medication has been prescribed to you.   - Take Oxycodone as prescribed up to every 4 hours as needed if you are still having pain after taking Tylenol and Motrin.   - DO NOT MIX NARCOTIC PAIN MEDICATIONS OR TAKE NARCOTIC PRESCRIPTIONS AT THE SAME TIME (PERCODET, LORTAB, ROXICODONE, ETC.)   - DO NOT DRIVE OR OPERATE HEAVY MACHINERY WHILE ON NARCOTICS    - DO NOT TAKE MORE THAN 4 GRAMS (4000mg) OF TYLENOL (ACETAMINOPHEN) IN 24 HOURS       Follow Up:    - A follow up appointment has been scheduled for you as outlined below:   Future Appointments   Date Time Provider Department Center   10/25/2023 11:00 AM Bettie Munoz PA-C [facility] ENT [facility]         For any questions, please call our clinic our leave us a My Chart message. [facility]:  [phone], then ask for ENT Clinic.   For after hours questions and/or urgent concerns, call the same number above ([phone]) and ask for the on-call ENT physician.

## 2023-10-12 ENCOUNTER — OFFICE VISIT (OUTPATIENT)
Dept: URGENT CARE | Facility: CLINIC | Age: 23
End: 2023-10-12
Payer: OTHER GOVERNMENT

## 2023-10-12 VITALS
HEIGHT: 73 IN | BODY MASS INDEX: 20.54 KG/M2 | SYSTOLIC BLOOD PRESSURE: 124 MMHG | TEMPERATURE: 98 F | OXYGEN SATURATION: 96 % | WEIGHT: 155 LBS | RESPIRATION RATE: 16 BRPM | HEART RATE: 57 BPM | DIASTOLIC BLOOD PRESSURE: 71 MMHG

## 2023-10-12 DIAGNOSIS — Z20.2 STD EXPOSURE: Primary | ICD-10-CM

## 2023-10-12 PROCEDURE — 99213 PR OFFICE/OUTPT VISIT, EST, LEVL III, 20-29 MIN: ICD-10-PCS | Mod: S$GLB,,, | Performed by: NURSE PRACTITIONER

## 2023-10-12 PROCEDURE — 99213 OFFICE O/P EST LOW 20 MIN: CPT | Mod: S$GLB,,, | Performed by: NURSE PRACTITIONER

## 2023-10-12 PROCEDURE — 87491 CHLMYD TRACH DNA AMP PROBE: CPT | Performed by: NURSE PRACTITIONER

## 2023-10-12 PROCEDURE — 87591 N.GONORRHOEAE DNA AMP PROB: CPT | Performed by: NURSE PRACTITIONER

## 2023-10-12 RX ORDER — DOXYCYCLINE 100 MG/1
100 CAPSULE ORAL 2 TIMES DAILY
Qty: 14 CAPSULE | Refills: 0 | Status: SHIPPED | OUTPATIENT
Start: 2023-10-12 | End: 2023-10-19

## 2023-10-13 ENCOUNTER — TELEPHONE (OUTPATIENT)
Dept: URGENT CARE | Facility: CLINIC | Age: 23
End: 2023-10-13
Payer: OTHER GOVERNMENT

## 2023-10-13 LAB
C TRACH DNA SPEC QL NAA+PROBE: DETECTED
N GONORRHOEA DNA SPEC QL NAA+PROBE: NOT DETECTED

## 2023-10-14 NOTE — TELEPHONE ENCOUNTER
Discussed chlamydia results with patient. Treated appropriately at time of visit. No further questions or concerns.

## 2023-10-15 LAB
FINAL PATHOLOGIC DIAGNOSIS: NORMAL
GROSS: NORMAL
Lab: NORMAL
MICROSCOPIC EXAM: NORMAL

## 2023-10-25 ENCOUNTER — OFFICE VISIT (OUTPATIENT)
Dept: OTOLARYNGOLOGY | Facility: CLINIC | Age: 23
End: 2023-10-25
Payer: OTHER GOVERNMENT

## 2023-10-25 VITALS — WEIGHT: 153.44 LBS | BODY MASS INDEX: 20.24 KG/M2

## 2023-10-25 DIAGNOSIS — Z90.89 S/P TONSILLECTOMY: Primary | ICD-10-CM

## 2023-10-25 PROCEDURE — 99999 PR PBB SHADOW E&M-EST. PATIENT-LVL II: ICD-10-PCS | Mod: PBBFAC,,, | Performed by: PHYSICIAN ASSISTANT

## 2023-10-25 PROCEDURE — 99212 OFFICE O/P EST SF 10 MIN: CPT | Mod: PBBFAC | Performed by: PHYSICIAN ASSISTANT

## 2023-10-25 PROCEDURE — 99024 PR POST-OP FOLLOW-UP VISIT: ICD-10-PCS | Mod: ,,, | Performed by: PHYSICIAN ASSISTANT

## 2023-10-25 PROCEDURE — 99024 POSTOP FOLLOW-UP VISIT: CPT | Mod: ,,, | Performed by: PHYSICIAN ASSISTANT

## 2023-10-25 PROCEDURE — 99999 PR PBB SHADOW E&M-EST. PATIENT-LVL II: CPT | Mod: PBBFAC,,, | Performed by: PHYSICIAN ASSISTANT

## 2023-10-25 NOTE — PROGRESS NOTES
Subjective:    Here to followup after tonsillectomy    Patient ID: Dameon Martinez is a 22 y.o. male.    Chief Complaint:  Recent tonsillectomy 10/4/23 with Dr. Harden     Dameon Martinez is a 22 y.o. male here to see me today after a recent tonsillectomy.   Following surgery, he is doing quite well at this time.  He experienced pain for 12 days, but is no longer requiring any oral pain medicine.  He has resumed a regular diet and all normal activities.  He did not experience any postoperative bleeding or other complications.  They have no specific questions or concerns today.    Review of Systems   Constitutional: Negative for fever and fatigue.   HENT: Negative for ear pain, mouth sores, sore throat, trouble swallowing and voice change.    Respiratory: Negative for cough.    Cardiovascular: Negative for chest pain.   Neurological: Negative for headaches.       Objective:     Physical Exam   Constitutional: He appears well-developed and well-nourished.   HENT:   Head: Normocephalic.   Right Ear: Tympanic membrane, external ear and ear canal normal. Tympanic membrane is not erythematous.   Left Ear: Tympanic membrane, external ear and ear canal normal. Tympanic membrane is not erythematous.   Nose: Nose normal. No rhinorrhea.   Mouth/Throat: Uvula is midline and oropharynx is clear and moist. No trismus in the jaw. Normal dentition. No uvula swelling.   Status post tonsillectomy, tonsillar fossae healing well   Lymphadenopathy:     He has no cervical adenopathy.       Assessment:     1. S/P tonsillectomy        Plan:        1. S/P tonsillectomy      Now status post tonsillectomy and doing well.  No further treatment needed at this time.   Return to clinic as needed.

## 2023-10-28 ENCOUNTER — PATIENT MESSAGE (OUTPATIENT)
Dept: INTERNAL MEDICINE | Facility: CLINIC | Age: 23
End: 2023-10-28
Payer: OTHER GOVERNMENT

## 2023-11-01 ENCOUNTER — OFFICE VISIT (OUTPATIENT)
Dept: URGENT CARE | Facility: CLINIC | Age: 23
End: 2023-11-01
Payer: OTHER GOVERNMENT

## 2023-11-01 VITALS
WEIGHT: 154 LBS | RESPIRATION RATE: 17 BRPM | SYSTOLIC BLOOD PRESSURE: 147 MMHG | BODY MASS INDEX: 20.86 KG/M2 | HEIGHT: 72 IN | HEART RATE: 74 BPM | TEMPERATURE: 98 F | DIASTOLIC BLOOD PRESSURE: 76 MMHG

## 2023-11-01 DIAGNOSIS — Z11.3 SCREENING EXAMINATION FOR STD (SEXUALLY TRANSMITTED DISEASE): Primary | ICD-10-CM

## 2023-11-01 PROCEDURE — 87591 N.GONORRHOEAE DNA AMP PROB: CPT | Performed by: PHYSICIAN ASSISTANT

## 2023-11-01 PROCEDURE — 99213 PR OFFICE/OUTPT VISIT, EST, LEVL III, 20-29 MIN: ICD-10-PCS | Mod: S$GLB,,, | Performed by: PHYSICIAN ASSISTANT

## 2023-11-01 PROCEDURE — 99213 OFFICE O/P EST LOW 20 MIN: CPT | Mod: S$GLB,,, | Performed by: PHYSICIAN ASSISTANT

## 2023-11-01 NOTE — PROGRESS NOTES
"Subjective:      Patient ID: Dameon Martinez is a 22 y.o. male.    Vitals:  height is 5' 11.93" (1.827 m) and weight is 69.8 kg (153 lb 15.9 oz). His tympanic temperature is 98 °F (36.7 °C). His blood pressure is 147/76 (abnormal) and his pulse is 74. His respiration is 17.     Chief Complaint: Exposure to STD    Pt presents to the clinic today with STD concerns.  Patient sexually active with his girlfriend.  They were both recently treated for chlamydia.  Patient tested positive for chlamydia on 10/12.  States that both him and his girlfriend took full course of medication and did not have sex until about 1 week ago.  Pt states that his girlfriend informed him yesterday that she tested positive for chlamydia again and also has vaginal sores (she has a herpes test pending). Pt denies any history of cold sores or genital herpes.  He is currently asymptomatic today but is requesting full STD screening.     Exposure to STD  The patient's pertinent negatives include no genital itching, genital lesions, penile discharge, penile pain, scrotal swelling or testicular pain. Primary symptoms comment: No symptoms today. The patient is experiencing no pain. Pertinent negatives include no abdominal pain, anorexia, chest pain, chills, constipation, coughing, diarrhea, discolored urine, dysuria, fever, flank pain, frequency, headaches, hematuria, hesitancy, joint pain, joint swelling, nausea, painful intercourse, rash, shortness of breath, sore throat, urgency, urinary retention or vomiting. He is sexually active. He never uses condoms. Yes, his partner has an STD. His past medical history is significant for chlamydia. There is no history of BPH, cryptorchidism, erectile aid use, erectile dysfunction, a femoral hernia, gonorrhea, herpes simplex, HIV, an inguinal hernia, kidney stones, prostatitis, sickle cell disease, syphilis or varicocele.       Constitution: Negative for chills and fever.   HENT:  Negative for sore throat.  "   Cardiovascular:  Negative for chest pain.   Respiratory:  Negative for cough and shortness of breath.    Gastrointestinal:  Negative for abdominal pain, nausea, vomiting, constipation and diarrhea.   Genitourinary:  Negative for dysuria, frequency, urgency, flank pain, penile discharge, penile pain, scrotal swelling and testicular pain.   Skin:  Negative for rash.   Neurological:  Negative for headaches.      Objective:     Physical Exam   Constitutional: He appears well-developed.  Non-toxic appearance. He does not appear ill. No distress.   HENT:   Head: Normocephalic and atraumatic.   Ears:   Right Ear: External ear normal.   Left Ear: External ear normal.   Nose: Nose normal.   Eyes: Conjunctivae and EOM are normal.   Neck: Neck supple.   Pulmonary/Chest: Effort normal.   Abdominal: Normal appearance.   Genitourinary:         Comments: Patient deferred, as he is asymptomatic     Musculoskeletal: Normal range of motion.         General: Normal range of motion.   Neurological: no focal deficit. He is alert. He displays no weakness. Gait normal.   Skin: Skin is warm, dry, not diaphoretic, not pale and no rash.   Psychiatric: His behavior is normal.       Assessment:     1. Screening examination for STD (sexually transmitted disease)        Plan:     Discussed with patient that we usually recommend to retest for chlamydia 4 weeks after treatment.  Given that his girlfriend tested positive again this week, will retest at this time.  Discussed etiology and transmission of herpes.  Patient currently asymptomatic and deferred any treatment until test resulted.  Patient will be contacted with results.  Advised to refrain from any sexual activity until all tests are resulted and he is treated appropriately.  Screening examination for STD (sexually transmitted disease)  -     C. trachomatis/N. gonorrhoeae by AMP DNA  -     RPR; Future; Expected date: 11/01/2023  -     HIV 1/2 Ag/Ab (4th Gen); Future; Expected date:  11/01/2023  -     HERPES SIMPLEX 1&2 IGG; Future; Expected date: 11/01/2023  -     HEPATITIS C ANTIBODY; Future; Expected date: 11/01/2023

## 2023-11-02 ENCOUNTER — LAB VISIT (OUTPATIENT)
Dept: LAB | Facility: HOSPITAL | Age: 23
End: 2023-11-02
Attending: PHYSICIAN ASSISTANT
Payer: OTHER GOVERNMENT

## 2023-11-02 DIAGNOSIS — Z11.3 SCREENING EXAMINATION FOR STD (SEXUALLY TRANSMITTED DISEASE): ICD-10-CM

## 2023-11-02 LAB
HCV AB SERPL QL IA: NORMAL
HIV 1+2 AB+HIV1 P24 AG SERPL QL IA: NORMAL

## 2023-11-02 PROCEDURE — 86592 SYPHILIS TEST NON-TREP QUAL: CPT | Performed by: PHYSICIAN ASSISTANT

## 2023-11-02 PROCEDURE — 36415 COLL VENOUS BLD VENIPUNCTURE: CPT | Performed by: PHYSICIAN ASSISTANT

## 2023-11-02 PROCEDURE — 86695 HERPES SIMPLEX TYPE 1 TEST: CPT | Performed by: PHYSICIAN ASSISTANT

## 2023-11-02 PROCEDURE — 86803 HEPATITIS C AB TEST: CPT | Performed by: PHYSICIAN ASSISTANT

## 2023-11-02 PROCEDURE — 87389 HIV-1 AG W/HIV-1&-2 AB AG IA: CPT | Performed by: PHYSICIAN ASSISTANT

## 2023-11-03 ENCOUNTER — TELEPHONE (OUTPATIENT)
Dept: URGENT CARE | Facility: CLINIC | Age: 23
End: 2023-11-03
Payer: OTHER GOVERNMENT

## 2023-11-03 LAB
C TRACH DNA SPEC QL NAA+PROBE: NOT DETECTED
HSV1 IGG SERPL QL IA: POSITIVE
HSV2 IGG SERPL QL IA: NEGATIVE
N GONORRHOEA DNA SPEC QL NAA+PROBE: NOT DETECTED
RPR SER QL: NORMAL

## 2023-11-03 NOTE — TELEPHONE ENCOUNTER
Called patient. ID confirmed with name and . Went over results. 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 He has no other questions or concerns.

## 2023-12-12 ENCOUNTER — PATIENT MESSAGE (OUTPATIENT)
Dept: FAMILY MEDICINE | Facility: CLINIC | Age: 23
End: 2023-12-12

## 2023-12-12 ENCOUNTER — LAB VISIT (OUTPATIENT)
Dept: LAB | Facility: HOSPITAL | Age: 23
End: 2023-12-12
Attending: NURSE PRACTITIONER
Payer: OTHER GOVERNMENT

## 2023-12-12 ENCOUNTER — OFFICE VISIT (OUTPATIENT)
Dept: FAMILY MEDICINE | Facility: CLINIC | Age: 23
End: 2023-12-12
Payer: OTHER GOVERNMENT

## 2023-12-12 VITALS
SYSTOLIC BLOOD PRESSURE: 130 MMHG | WEIGHT: 161.19 LBS | HEART RATE: 77 BPM | OXYGEN SATURATION: 99 % | TEMPERATURE: 99 F | BODY MASS INDEX: 22.56 KG/M2 | HEIGHT: 71 IN | DIASTOLIC BLOOD PRESSURE: 70 MMHG

## 2023-12-12 DIAGNOSIS — Z02.0 SCHOOL PHYSICAL EXAM: Primary | ICD-10-CM

## 2023-12-12 DIAGNOSIS — Z23 NEED FOR VACCINATION: ICD-10-CM

## 2023-12-12 DIAGNOSIS — Z02.0 SCHOOL PHYSICAL EXAM: ICD-10-CM

## 2023-12-12 PROCEDURE — 99999 PR PBB SHADOW E&M-EST. PATIENT-LVL III: CPT | Mod: PBBFAC,,, | Performed by: NURSE PRACTITIONER

## 2023-12-12 PROCEDURE — 99999PBSHW POCT TB SKIN TEST: Mod: PBBFAC,,,

## 2023-12-12 PROCEDURE — 86787 VARICELLA-ZOSTER ANTIBODY: CPT | Performed by: NURSE PRACTITIONER

## 2023-12-12 PROCEDURE — 86765 RUBEOLA ANTIBODY: CPT | Performed by: NURSE PRACTITIONER

## 2023-12-12 PROCEDURE — 86762 RUBELLA ANTIBODY: CPT | Performed by: NURSE PRACTITIONER

## 2023-12-12 PROCEDURE — 99999PBSHW FLU VACCINE (QUAD) GREATER THAN OR EQUAL TO 3YO PRESERVATIVE FREE IM: Mod: PBBFAC,,,

## 2023-12-12 PROCEDURE — 86580 TB INTRADERMAL TEST: CPT | Mod: PBBFAC,PO

## 2023-12-12 PROCEDURE — 90471 IMMUNIZATION ADMIN: CPT | Mod: PBBFAC,PO

## 2023-12-12 PROCEDURE — 99999 PR PBB SHADOW E&M-EST. PATIENT-LVL III: ICD-10-PCS | Mod: PBBFAC,,, | Performed by: NURSE PRACTITIONER

## 2023-12-12 PROCEDURE — 99213 OFFICE O/P EST LOW 20 MIN: CPT | Mod: PBBFAC,PO | Performed by: NURSE PRACTITIONER

## 2023-12-12 PROCEDURE — 86735 MUMPS ANTIBODY: CPT | Performed by: NURSE PRACTITIONER

## 2023-12-12 PROCEDURE — 86706 HEP B SURFACE ANTIBODY: CPT | Performed by: NURSE PRACTITIONER

## 2023-12-12 PROCEDURE — 99395 PREV VISIT EST AGE 18-39: CPT | Mod: S$PBB,,, | Performed by: NURSE PRACTITIONER

## 2023-12-12 PROCEDURE — 99395 PR PREVENTIVE VISIT,EST,18-39: ICD-10-PCS | Mod: S$PBB,,, | Performed by: NURSE PRACTITIONER

## 2023-12-12 PROCEDURE — 99999PBSHW POCT TB SKIN TEST: ICD-10-PCS | Mod: PBBFAC,,,

## 2023-12-12 NOTE — PROGRESS NOTES
Subjective:       Patient ID: Dameon Martinez is a 22 y.o. male.    Chief Complaint: Annual Exam  Pt reports to clinic for school physical exam.  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  No illegal drug use, denies hemoptysis or unintentional weight loss.  LINKS reviewed, pt agrees to obtaining flu vaccine and will obtain covid vaccine at nearby pharmacy.  No chest pain or SOB on exertion.  Mood is good on current treatment    Past Medical History:   Diagnosis Date    Anxiety     Chronic tonsillitis 10/4/2023    Patient denies medical problems       Current Outpatient Medications on File Prior to Visit   Medication Sig Dispense Refill    cetirizine (ZYRTEC) 5 MG tablet Take 5 mg by mouth 2 (two) times daily as needed.       EScitalopram oxalate (LEXAPRO) 20 MG tablet Take 1 tablet (20 mg total) by mouth once daily. 90 tablet 3    busPIRone (BUSPAR) 10 MG tablet Take 1 tablet (10 mg total) by mouth 3 (three) times daily. 270 tablet 0     No current facility-administered medications on file prior to visit.      HPI  Review of Systems   Constitutional:  Negative for activity change and unexpected weight change.   HENT:  Negative for hearing loss, rhinorrhea and trouble swallowing.    Eyes:  Negative for discharge and visual disturbance.   Respiratory:  Negative for chest tightness and wheezing.    Cardiovascular:  Negative for chest pain and palpitations.   Gastrointestinal:  Negative for blood in stool, constipation, diarrhea and vomiting.   Endocrine: Negative for polydipsia and polyuria.   Genitourinary:  Negative for difficulty urinating, hematuria and urgency.   Musculoskeletal:  Negative for arthralgias, joint swelling and neck pain.   Neurological:  Negative for weakness and headaches.   Psychiatric/Behavioral:  Negative for confusion and dysphoric mood.        Objective:      Physical Exam  Vitals reviewed.   Constitutional:       Appearance: He is well-developed.   HENT:      Head: Normocephalic.   Eyes:      Pupils:  Pupils are equal, round, and reactive to light.   Cardiovascular:      Rate and Rhythm: Normal rate and regular rhythm.      Heart sounds: Normal heart sounds.   Pulmonary:      Effort: No respiratory distress.      Breath sounds: Normal breath sounds.   Abdominal:      General: Bowel sounds are normal.      Palpations: Abdomen is soft.   Musculoskeletal:         General: Normal range of motion.      Cervical back: Normal range of motion.   Skin:     General: Skin is warm and dry.   Neurological:      Mental Status: He is alert and oriented to person, place, and time.         Assessment:       1. School physical exam    2. Need for vaccination        Plan:   School physical exam  -     Rubella antibody, IgG; Future; Expected date: 12/12/2023  -     Rubeola antibody IgG; Future; Expected date: 12/12/2023  -     Mumps, IgG Screen; Future; Expected date: 12/12/2023  -     Hepatitis B Surface Antibody, Qual/Quant; Future; Expected date: 12/12/2023  -     Varicella zoster antibody, IgG; Future; Expected date: 12/12/2023  -     POCT TB Skin Test  Awaiting titers, will complete paperwork.  Ok to attend, pending therapeutic titers  Need for vaccination  -     Influenza - Quadrivalent (PF)      No follow-ups on file.

## 2023-12-13 LAB
MUMPS IGG INTERPRETATION: POSITIVE
MUMPS IGG SCREEN: 56.6 AU/ML
RUBEOLA IGG ANTIBODY: >300 AU/ML
RUBEOLA INTERPRETATION: POSITIVE
RUBV IGG SER-ACNC: 25 IU/ML
RUBV IGG SER-IMP: REACTIVE
VARICELLA INTERPRETATION: POSITIVE
VARICELLA ZOSTER IGG: 574 AU/ML

## 2023-12-14 ENCOUNTER — CLINICAL SUPPORT (OUTPATIENT)
Dept: FAMILY MEDICINE | Facility: CLINIC | Age: 23
End: 2023-12-14
Payer: OTHER GOVERNMENT

## 2023-12-14 DIAGNOSIS — Z02.0 SCHOOL PHYSICAL EXAM: Primary | ICD-10-CM

## 2023-12-15 ENCOUNTER — TELEPHONE (OUTPATIENT)
Dept: FAMILY MEDICINE | Facility: CLINIC | Age: 23
End: 2023-12-15
Payer: OTHER GOVERNMENT

## 2023-12-15 NOTE — TELEPHONE ENCOUNTER
Left message for pt to call office back about paperwork. Ashleigh is waiting on one lab to come back before she can sign off on it.

## 2023-12-15 NOTE — TELEPHONE ENCOUNTER
----- Message from Aleshia Judd sent at 12/15/2023  4:54 PM CST -----  Contact: aki Xiao is needing a call back in regards to his physical needing to be signed and sent to his patient. Please give him a call back at [phone]

## 2023-12-16 LAB
HBV SURFACE AB SER QL IA: NEGATIVE
HBV SURFACE AB SERPL IA-ACNC: <3 MIU/ML

## 2023-12-18 DIAGNOSIS — Z23 NEED FOR PROPHYLACTIC VACCINATION AGAINST HEPATITIS B VIRUS: Primary | ICD-10-CM

## 2023-12-20 ENCOUNTER — TELEPHONE (OUTPATIENT)
Dept: FAMILY MEDICINE | Facility: CLINIC | Age: 23
End: 2023-12-20
Payer: OTHER GOVERNMENT

## 2023-12-20 ENCOUNTER — CLINICAL SUPPORT (OUTPATIENT)
Dept: FAMILY MEDICINE | Facility: CLINIC | Age: 23
End: 2023-12-20
Payer: OTHER GOVERNMENT

## 2023-12-20 DIAGNOSIS — Z02.0 SCHOOL PHYSICAL EXAM: Primary | ICD-10-CM

## 2023-12-20 PROCEDURE — 90471 IMMUNIZATION ADMIN: CPT | Mod: PBBFAC,PO

## 2023-12-20 PROCEDURE — 99999PBSHW HEPATITIS B (RECOMBINANT) ADJUVANTED, 2 DOSE: ICD-10-PCS | Mod: PBBFAC,,,

## 2023-12-20 PROCEDURE — 90739 HEPB VACC 2/4 DOSE ADULT IM: CPT | Mod: PBBFAC,PO

## 2023-12-20 PROCEDURE — 99211 OFF/OP EST MAY X REQ PHY/QHP: CPT | Mod: PBBFAC,PO

## 2023-12-20 PROCEDURE — 99999PBSHW HEPATITIS B (RECOMBINANT) ADJUVANTED, 2 DOSE: Mod: PBBFAC,,,

## 2023-12-20 PROCEDURE — 99999 PR PBB SHADOW E&M-EST. PATIENT-LVL I: CPT | Mod: PBBFAC,,,

## 2023-12-20 PROCEDURE — 99999 PR PBB SHADOW E&M-EST. PATIENT-LVL I: ICD-10-PCS | Mod: PBBFAC,,,

## 2023-12-20 NOTE — TELEPHONE ENCOUNTER
----- Message from Queta Fatima sent at 12/20/2023  3:17 PM CST -----  Contact: Dameon Xiao is calling to speak to the nurse regarding missing a hepatitis booster vaccine, he would like to come in asap, please give him a call at [phone]    Thanks  LJ

## 2024-02-01 ENCOUNTER — LAB VISIT (OUTPATIENT)
Dept: LAB | Facility: HOSPITAL | Age: 24
End: 2024-02-01
Attending: FAMILY MEDICINE
Payer: OTHER GOVERNMENT

## 2024-02-01 ENCOUNTER — OFFICE VISIT (OUTPATIENT)
Dept: FAMILY MEDICINE | Facility: CLINIC | Age: 24
End: 2024-02-01
Payer: OTHER GOVERNMENT

## 2024-02-01 VITALS
DIASTOLIC BLOOD PRESSURE: 60 MMHG | WEIGHT: 155.56 LBS | TEMPERATURE: 99 F | BODY MASS INDEX: 21.78 KG/M2 | HEART RATE: 77 BPM | SYSTOLIC BLOOD PRESSURE: 130 MMHG | HEIGHT: 71 IN | OXYGEN SATURATION: 97 %

## 2024-02-01 DIAGNOSIS — Z00.00 ANNUAL PHYSICAL EXAM: Primary | ICD-10-CM

## 2024-02-01 DIAGNOSIS — J30.89 NON-SEASONAL ALLERGIC RHINITIS, UNSPECIFIED TRIGGER: ICD-10-CM

## 2024-02-01 DIAGNOSIS — Z00.00 ANNUAL PHYSICAL EXAM: ICD-10-CM

## 2024-02-01 DIAGNOSIS — F41.1 GAD (GENERALIZED ANXIETY DISORDER): ICD-10-CM

## 2024-02-01 DIAGNOSIS — K21.9 GASTROESOPHAGEAL REFLUX DISEASE, UNSPECIFIED WHETHER ESOPHAGITIS PRESENT: ICD-10-CM

## 2024-02-01 LAB
BASOPHILS # BLD AUTO: 0.05 K/UL (ref 0–0.2)
BASOPHILS NFR BLD: 0.6 % (ref 0–1.9)
DIFFERENTIAL METHOD BLD: NORMAL
EOSINOPHIL # BLD AUTO: 0.4 K/UL (ref 0–0.5)
EOSINOPHIL NFR BLD: 4.9 % (ref 0–8)
ERYTHROCYTE [DISTWIDTH] IN BLOOD BY AUTOMATED COUNT: 12 % (ref 11.5–14.5)
HCT VFR BLD AUTO: 45.8 % (ref 40–54)
HGB BLD-MCNC: 15.4 G/DL (ref 14–18)
IMM GRANULOCYTES # BLD AUTO: 0.02 K/UL (ref 0–0.04)
IMM GRANULOCYTES NFR BLD AUTO: 0.2 % (ref 0–0.5)
LYMPHOCYTES # BLD AUTO: 2.6 K/UL (ref 1–4.8)
LYMPHOCYTES NFR BLD: 31.1 % (ref 18–48)
MCH RBC QN AUTO: 30.1 PG (ref 27–31)
MCHC RBC AUTO-ENTMCNC: 33.6 G/DL (ref 32–36)
MCV RBC AUTO: 90 FL (ref 82–98)
MONOCYTES # BLD AUTO: 0.5 K/UL (ref 0.3–1)
MONOCYTES NFR BLD: 6.1 % (ref 4–15)
NEUTROPHILS # BLD AUTO: 4.7 K/UL (ref 1.8–7.7)
NEUTROPHILS NFR BLD: 57.1 % (ref 38–73)
NRBC BLD-RTO: 0 /100 WBC
PLATELET # BLD AUTO: 284 K/UL (ref 150–450)
PMV BLD AUTO: 10 FL (ref 9.2–12.9)
RBC # BLD AUTO: 5.12 M/UL (ref 4.6–6.2)
WBC # BLD AUTO: 8.22 K/UL (ref 3.9–12.7)

## 2024-02-01 PROCEDURE — 99999 PR PBB SHADOW E&M-EST. PATIENT-LVL III: CPT | Mod: PBBFAC,,, | Performed by: FAMILY MEDICINE

## 2024-02-01 PROCEDURE — 87389 HIV-1 AG W/HIV-1&-2 AB AG IA: CPT | Performed by: FAMILY MEDICINE

## 2024-02-01 PROCEDURE — 87491 CHLMYD TRACH DNA AMP PROBE: CPT | Performed by: FAMILY MEDICINE

## 2024-02-01 PROCEDURE — 99213 OFFICE O/P EST LOW 20 MIN: CPT | Mod: PBBFAC,PO | Performed by: FAMILY MEDICINE

## 2024-02-01 PROCEDURE — 99214 OFFICE O/P EST MOD 30 MIN: CPT | Mod: S$PBB,,, | Performed by: FAMILY MEDICINE

## 2024-02-01 PROCEDURE — 85025 COMPLETE CBC W/AUTO DIFF WBC: CPT | Performed by: FAMILY MEDICINE

## 2024-02-01 PROCEDURE — 86592 SYPHILIS TEST NON-TREP QUAL: CPT | Performed by: FAMILY MEDICINE

## 2024-02-01 PROCEDURE — 83036 HEMOGLOBIN GLYCOSYLATED A1C: CPT | Performed by: FAMILY MEDICINE

## 2024-02-01 PROCEDURE — 80053 COMPREHEN METABOLIC PANEL: CPT | Performed by: FAMILY MEDICINE

## 2024-02-01 PROCEDURE — 36415 COLL VENOUS BLD VENIPUNCTURE: CPT | Mod: PO | Performed by: FAMILY MEDICINE

## 2024-02-01 PROCEDURE — 84443 ASSAY THYROID STIM HORMONE: CPT | Performed by: FAMILY MEDICINE

## 2024-02-01 PROCEDURE — 86803 HEPATITIS C AB TEST: CPT | Performed by: FAMILY MEDICINE

## 2024-02-01 RX ORDER — BUSPIRONE HYDROCHLORIDE 10 MG/1
10 TABLET ORAL 3 TIMES DAILY
Qty: 270 TABLET | Refills: 3 | Status: SHIPPED | OUTPATIENT
Start: 2024-02-01 | End: 2024-06-09 | Stop reason: SDUPTHER

## 2024-02-01 RX ORDER — ESCITALOPRAM OXALATE 20 MG/1
20 TABLET ORAL DAILY
Qty: 90 TABLET | Refills: 3 | Status: SHIPPED | OUTPATIENT
Start: 2024-02-01 | End: 2024-05-03 | Stop reason: SDUPTHER

## 2024-02-01 RX ORDER — FLUTICASONE PROPIONATE 50 MCG
1 SPRAY, SUSPENSION (ML) NASAL DAILY
Qty: 18.2 ML | Refills: 3 | Status: SHIPPED | OUTPATIENT
Start: 2024-02-01 | End: 2024-05-03 | Stop reason: SDUPTHER

## 2024-02-01 NOTE — PROGRESS NOTES
"Subjective:       Patient ID: Dameon Martinez is a 23 y.o. male.    Chief Complaint: Follow-up      HPI Comments:       Current Outpatient Medications:     cetirizine (ZYRTEC) 5 MG tablet, Take 5 mg by mouth 2 (two) times daily as needed. , Disp: , Rfl:     busPIRone (BUSPAR) 10 MG tablet, Take 1 tablet (10 mg total) by mouth 3 (three) times daily., Disp: 270 tablet, Rfl: 3    EScitalopram oxalate (LEXAPRO) 20 MG tablet, Take 1 tablet (20 mg total) by mouth once daily., Disp: 90 tablet, Rfl: 3    fluticasone propionate (FLONASE) 50 mcg/actuation nasal spray, 1 spray (50 mcg total) by Each Nostril route once daily., Disp: 18.2 mL, Rfl: 3      This my 1st time seeing this patient.  He would like to establish care.      Long time treated for anxiety disorder.  Lexapro and BuSpar.  Likes his combination and needs refills.  When his dog chewed his BuSpar he had to go without it 6 weeks ago and noticed a difference.    Complains of a new problem of heartburn.  Just last week or so.  Taking Zantac b.i.d. with good response.  Today we talked about general measures to prevent GERD.  Takes Zantac for one-month and follow-up if persistent or recurrent problems.  Says he has a bad diet.  No alcohol    In nursing school.      Review of Systems   Constitutional:  Negative for activity change, appetite change and fever.   HENT:  Negative for sore throat.    Respiratory:  Negative for cough and shortness of breath.    Cardiovascular:  Negative for chest pain.   Gastrointestinal:  Negative for abdominal pain, diarrhea and nausea.   Genitourinary:  Negative for difficulty urinating.   Musculoskeletal:  Negative for arthralgias and myalgias.   Neurological:  Negative for dizziness and headaches.       Objective:      Vitals:    02/01/24 1311   BP: 130/60   Pulse: 77   Temp: 99.1 °F (37.3 °C)   TempSrc: Tympanic   SpO2: 97%   Weight: 70.5 kg (155 lb 8.6 oz)   Height: 5' 11" (1.803 m)   PainSc: 0-No pain     Physical Exam  Vitals and " nursing note reviewed.   Constitutional:       General: He is not in acute distress.     Appearance: He is well-developed. He is not diaphoretic.   HENT:      Head: Normocephalic.      Mouth/Throat:      Pharynx: Pharyngeal swelling present. No oropharyngeal exudate or posterior oropharyngeal erythema.   Neck:      Thyroid: No thyromegaly.   Cardiovascular:      Rate and Rhythm: Normal rate and regular rhythm.      Heart sounds: Normal heart sounds. No murmur heard.  Pulmonary:      Effort: Pulmonary effort is normal.      Breath sounds: Normal breath sounds. No wheezing or rales.   Abdominal:      General: There is no distension.      Palpations: Abdomen is soft.   Musculoskeletal:      Cervical back: Neck supple.   Lymphadenopathy:      Cervical: No cervical adenopathy.   Skin:     General: Skin is warm and dry.   Neurological:      Mental Status: He is alert and oriented to person, place, and time.   Psychiatric:         Behavior: Behavior normal.         Thought Content: Thought content normal.         Judgment: Judgment normal.         Assessment:       1. Annual physical exam    2. YASIR (generalized anxiety disorder)    3. Non-seasonal allergic rhinitis, unspecified trigger    4. Gastroesophageal reflux disease, unspecified whether esophagitis present        Plan:   Annual physical exam  Comments:  A1c, STD testing  Orders:  -     Hepatitis C Antibody; Future; Expected date: 02/01/2024  -     Hemoglobin A1C; Future; Expected date: 02/01/2024  -     Comprehensive Metabolic Panel; Future; Expected date: 02/01/2024  -     CBC Auto Differential; Future; Expected date: 02/01/2024  -     TSH; Future; Expected date: 02/01/2024  -     HIV 1/2 Ag/Ab (4th Gen); Future; Expected date: 02/01/2024  -     RPR; Future; Expected date: 02/01/2024  -     C. trachomatis/N. gonorrhoeae by AMP DNA    YASIR (generalized anxiety disorder)  Comments:  Refills given.  Follow-up in 1 year  Orders:  -     busPIRone (BUSPAR) 10 MG tablet;  Take 1 tablet (10 mg total) by mouth 3 (three) times daily.  Dispense: 270 tablet; Refill: 3  -     EScitalopram oxalate (LEXAPRO) 20 MG tablet; Take 1 tablet (20 mg total) by mouth once daily.  Dispense: 90 tablet; Refill: 3    Non-seasonal allergic rhinitis, unspecified trigger  Comments:  Significant findings on throat exam.  Recommend Flonase    Gastroesophageal reflux disease, unspecified whether esophagitis present  Comments:  Takes Zantac for one-month.  Follow-up persistent or recurrent symptoms    Other orders  -     fluticasone propionate (FLONASE) 50 mcg/actuation nasal spray; 1 spray (50 mcg total) by Each Nostril route once daily.  Dispense: 18.2 mL; Refill: 3

## 2024-02-02 LAB
ALBUMIN SERPL BCP-MCNC: 4.6 G/DL (ref 3.5–5.2)
ALP SERPL-CCNC: 72 U/L (ref 55–135)
ALT SERPL W/O P-5'-P-CCNC: 11 U/L (ref 10–44)
ANION GAP SERPL CALC-SCNC: 11 MMOL/L (ref 8–16)
AST SERPL-CCNC: 14 U/L (ref 10–40)
BILIRUB SERPL-MCNC: 0.5 MG/DL (ref 0.1–1)
BUN SERPL-MCNC: 18 MG/DL (ref 6–20)
CALCIUM SERPL-MCNC: 9.7 MG/DL (ref 8.7–10.5)
CHLORIDE SERPL-SCNC: 101 MMOL/L (ref 95–110)
CO2 SERPL-SCNC: 27 MMOL/L (ref 23–29)
CREAT SERPL-MCNC: 1.1 MG/DL (ref 0.5–1.4)
EST. GFR  (NO RACE VARIABLE): >60 ML/MIN/1.73 M^2
ESTIMATED AVG GLUCOSE: 97 MG/DL (ref 68–131)
GLUCOSE SERPL-MCNC: 84 MG/DL (ref 70–110)
HBA1C MFR BLD: 5 % (ref 4–5.6)
HCV AB SERPL QL IA: NORMAL
HIV 1+2 AB+HIV1 P24 AG SERPL QL IA: NORMAL
POTASSIUM SERPL-SCNC: 3.7 MMOL/L (ref 3.5–5.1)
PROT SERPL-MCNC: 7.7 G/DL (ref 6–8.4)
RPR SER QL: NORMAL
SODIUM SERPL-SCNC: 139 MMOL/L (ref 136–145)
TSH SERPL DL<=0.005 MIU/L-ACNC: 0.45 UIU/ML (ref 0.4–4)

## 2024-02-06 LAB
C TRACH DNA SPEC QL NAA+PROBE: NOT DETECTED
N GONORRHOEA DNA SPEC QL NAA+PROBE: NOT DETECTED

## 2024-02-08 NOTE — LETTER
March 14, 2017      [facility]  [address] [place] [address]  Phone: [phone]  Fax: [phone]       Patient: Dameon Martinze   YOB: 2000  Date of Visit: 03/14/2017    To Whom It May Concern:    Dameon was at [facility] on 03/14/2017. He may return to work/school on 03/16/2017 with out restrictions. If you have any questions or concerns, or if I can be of further assistance, please do not hesitate to contact me. Please excuse 03/13/2017.    Sincerely,    Natali Carver NP      No answer, left message advising the pt to call the office back.

## 2024-05-03 DIAGNOSIS — F41.1 GAD (GENERALIZED ANXIETY DISORDER): ICD-10-CM

## 2024-05-03 RX ORDER — FLUTICASONE PROPIONATE 50 MCG
1 SPRAY, SUSPENSION (ML) NASAL DAILY
Qty: 48 G | Refills: 2 | Status: SHIPPED | OUTPATIENT
Start: 2024-05-03 | End: 2024-06-09 | Stop reason: SDUPTHER

## 2024-05-03 RX ORDER — ESCITALOPRAM OXALATE 20 MG/1
20 TABLET ORAL DAILY
Qty: 90 TABLET | Refills: 2 | Status: SHIPPED | OUTPATIENT
Start: 2024-05-03

## 2024-05-03 NOTE — TELEPHONE ENCOUNTER
No care due was identified.  [facility] Embedded Care Due Messages. Reference number: [id number].   5/03/2024 3:45:15 PM CDT

## 2024-05-03 NOTE — TELEPHONE ENCOUNTER
Refill Routing Note   Medication(s) are not appropriate for processing by [facility] for the following reason(s):        Outside of protocol    [facility] action(s):  Route  Approve               Appointments  past 12m or future 3m with PCP    Date Provider   Last Visit   2/1/2024 Sterling Villalta MD   Next Visit   Visit date not found Sterling Villalta MD   ED visits in past 90 days: 0        Note composed:6:57 PM 05/03/2024

## 2024-05-06 RX ORDER — BUSPIRONE HYDROCHLORIDE 10 MG/1
10 TABLET ORAL 3 TIMES DAILY
Qty: 270 TABLET | Refills: 2 | OUTPATIENT
Start: 2024-05-06

## 2024-05-08 ENCOUNTER — OFFICE VISIT (OUTPATIENT)
Dept: FAMILY MEDICINE | Facility: CLINIC | Age: 24
End: 2024-05-08
Payer: OTHER GOVERNMENT

## 2024-05-08 VITALS
HEART RATE: 51 BPM | SYSTOLIC BLOOD PRESSURE: 110 MMHG | WEIGHT: 158.38 LBS | HEIGHT: 71 IN | OXYGEN SATURATION: 96 % | DIASTOLIC BLOOD PRESSURE: 78 MMHG | TEMPERATURE: 98 F | BODY MASS INDEX: 22.17 KG/M2

## 2024-05-08 DIAGNOSIS — J30.89 NON-SEASONAL ALLERGIC RHINITIS, UNSPECIFIED TRIGGER: ICD-10-CM

## 2024-05-08 DIAGNOSIS — Z77.122: ICD-10-CM

## 2024-05-08 DIAGNOSIS — F41.1 GAD (GENERALIZED ANXIETY DISORDER): Primary | ICD-10-CM

## 2024-05-08 DIAGNOSIS — R53.83 FATIGUE, UNSPECIFIED TYPE: ICD-10-CM

## 2024-05-08 DIAGNOSIS — H93.12 TINNITUS OF LEFT EAR: ICD-10-CM

## 2024-05-08 DIAGNOSIS — K21.9 GASTROESOPHAGEAL REFLUX DISEASE, UNSPECIFIED WHETHER ESOPHAGITIS PRESENT: ICD-10-CM

## 2024-05-08 PROCEDURE — 99214 OFFICE O/P EST MOD 30 MIN: CPT | Mod: PBBFAC,PO | Performed by: FAMILY MEDICINE

## 2024-05-08 PROCEDURE — 99999 PR PBB SHADOW E&M-EST. PATIENT-LVL IV: CPT | Mod: PBBFAC,,, | Performed by: FAMILY MEDICINE

## 2024-05-08 PROCEDURE — 99214 OFFICE O/P EST MOD 30 MIN: CPT | Mod: S$PBB,,, | Performed by: FAMILY MEDICINE
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
## 2024-05-09 ENCOUNTER — LAB VISIT (OUTPATIENT)
Dept: LAB | Facility: HOSPITAL | Age: 24
End: 2024-05-09
Attending: FAMILY MEDICINE
Payer: OTHER GOVERNMENT

## 2024-05-09 DIAGNOSIS — R53.83 FATIGUE, UNSPECIFIED TYPE: ICD-10-CM

## 2024-05-09 LAB — TESTOST SERPL-MCNC: 823 NG/DL (ref 304–1227)

## 2024-05-09 PROCEDURE — 36415 COLL VENOUS BLD VENIPUNCTURE: CPT | Mod: PO | Performed by: FAMILY MEDICINE

## 2024-05-09 PROCEDURE — 84403 ASSAY OF TOTAL TESTOSTERONE: CPT | Performed by: FAMILY MEDICINE

## 2024-05-14 ENCOUNTER — OFFICE VISIT (OUTPATIENT)
Dept: OTOLARYNGOLOGY | Facility: CLINIC | Age: 24
End: 2024-05-14
Payer: OTHER GOVERNMENT

## 2024-05-14 ENCOUNTER — CLINICAL SUPPORT (OUTPATIENT)
Dept: AUDIOLOGY | Facility: CLINIC | Age: 24
End: 2024-05-14
Payer: OTHER GOVERNMENT

## 2024-05-14 VITALS — HEIGHT: 72 IN | WEIGHT: 162.06 LBS | BODY MASS INDEX: 21.95 KG/M2

## 2024-05-14 DIAGNOSIS — Z77.122: ICD-10-CM

## 2024-05-14 DIAGNOSIS — H93.12 TINNITUS OF LEFT EAR: ICD-10-CM

## 2024-05-14 DIAGNOSIS — H93.12 TINNITUS, LEFT: Primary | ICD-10-CM

## 2024-05-14 PROCEDURE — 99214 OFFICE O/P EST MOD 30 MIN: CPT | Mod: S$PBB,,, | Performed by: OTOLARYNGOLOGY

## 2024-05-14 PROCEDURE — 99999 PR PBB SHADOW E&M-EST. PATIENT-LVL I: CPT | Mod: PBBFAC,,, | Performed by: AUDIOLOGIST-HEARING AID FITTER

## 2024-05-14 PROCEDURE — 99211 OFF/OP EST MAY X REQ PHY/QHP: CPT | Mod: PBBFAC,27 | Performed by: AUDIOLOGIST-HEARING AID FITTER

## 2024-05-14 PROCEDURE — 92567 TYMPANOMETRY: CPT | Mod: PBBFAC | Performed by: AUDIOLOGIST-HEARING AID FITTER

## 2024-05-14 PROCEDURE — 99999 PR PBB SHADOW E&M-EST. PATIENT-LVL III: CPT | Mod: PBBFAC,,, | Performed by: OTOLARYNGOLOGY

## 2024-05-14 PROCEDURE — 99213 OFFICE O/P EST LOW 20 MIN: CPT | Mod: PBBFAC | Performed by: OTOLARYNGOLOGY

## 2024-05-14 PROCEDURE — 92557 COMPREHENSIVE HEARING TEST: CPT | Mod: PBBFAC | Performed by: AUDIOLOGIST-HEARING AID FITTER

## 2024-05-14 NOTE — LETTER
May 14, 2024      [facility]  [address] [place] [address]  Phone: [phone]  Fax: [phone]       Patient: Dameon Martinez   YOB: 2000  Date of Visit: 05/14/2024    To Whom It May Concern:    Mary Martinez  was at [facility] on 05/14/2024. The patient may return to work on 5/14/2024 with no restrictions. If you have any questions or concerns, or if I can be of further assistance, please do not hesitate to contact me.    Sincerely,    Giuliano De MA
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
## 2024-06-09 DIAGNOSIS — F41.1 GAD (GENERALIZED ANXIETY DISORDER): ICD-10-CM

## 2024-06-09 NOTE — TELEPHONE ENCOUNTER
No care due was identified.  [facility] Embedded Care Due Messages. Reference number: [id number].   6/09/2024 12:39:58 PM CDT

## 2024-06-10 RX ORDER — FLUTICASONE PROPIONATE 50 MCG
1 SPRAY, SUSPENSION (ML) NASAL DAILY
Qty: 48 G | Refills: 3 | Status: SHIPPED | OUTPATIENT
Start: 2024-06-10

## 2024-06-10 NOTE — TELEPHONE ENCOUNTER
Refill Routing Note   Medication(s) are not appropriate for processing by [facility] for the following reason(s):        Outside of protocol    [facility] action(s):  Approve  Route               Appointments  past 12m or future 3m with PCP    Date Provider   Last Visit   5/8/2024 Sterling Villalta MD   Next Visit   Visit date not found Sterling Villalta MD   ED visits in past 90 days: 0        Note composed:10:30 AM 06/10/2024

## 2024-06-13 RX ORDER — BUSPIRONE HYDROCHLORIDE 10 MG/1
10 TABLET ORAL 3 TIMES DAILY
Qty: 90 TABLET | Refills: 3 | Status: SHIPPED | OUTPATIENT
Start: 2024-06-13 | End: 2024-08-12

## 2024-07-30 DIAGNOSIS — F41.1 GAD (GENERALIZED ANXIETY DISORDER): ICD-10-CM

## 2024-07-30 NOTE — TELEPHONE ENCOUNTER
No care due was identified.  [facility] Embedded Care Due Messages. Reference number: [id number].   7/30/2024 3:52:58 PM CDT

## 2024-07-31 RX ORDER — ESCITALOPRAM OXALATE 20 MG/1
20 TABLET ORAL DAILY
Qty: 90 TABLET | Refills: 3 | Status: SHIPPED | OUTPATIENT
Start: 2024-07-31

## 2024-08-01 NOTE — TELEPHONE ENCOUNTER
Refill Routing Note   Medication(s) are not appropriate for processing by [facility] for the following reason(s):        Outside of protocol    [facility] action(s):  Route  Approve             Appointments  past 12m or future 3m with PCP    Date Provider   Last Visit   5/8/2024 Sterling Villalta MD   Next Visit   Visit date not found Sterling Villalta MD   ED visits in past 90 days: 0        Note composed:11:48 PM 07/31/2024

## 2024-08-03 RX ORDER — BUSPIRONE HYDROCHLORIDE 10 MG/1
10 TABLET ORAL 3 TIMES DAILY
Qty: 90 TABLET | Refills: 3 | Status: SHIPPED | OUTPATIENT
Start: 2024-08-03 | End: 2024-11-01

## 2024-10-02 DIAGNOSIS — F41.1 GAD (GENERALIZED ANXIETY DISORDER): ICD-10-CM

## 2024-10-02 NOTE — TELEPHONE ENCOUNTER
No care due was identified.  [facility] Embedded Care Due Messages. Reference number: [id number].   10/02/2024 3:17:41 AM CDT

## 2024-10-09 RX ORDER — BUSPIRONE HYDROCHLORIDE 15 MG/1
15 TABLET ORAL 2 TIMES DAILY
Qty: 90 TABLET | Refills: 3 | Status: SHIPPED | OUTPATIENT
Start: 2024-10-09

## 2024-11-06 ENCOUNTER — OFFICE VISIT (OUTPATIENT)
Dept: URGENT CARE | Facility: CLINIC | Age: 24
End: 2024-11-06
Payer: OTHER GOVERNMENT

## 2024-11-06 VITALS
SYSTOLIC BLOOD PRESSURE: 135 MMHG | BODY MASS INDEX: 22.74 KG/M2 | DIASTOLIC BLOOD PRESSURE: 67 MMHG | RESPIRATION RATE: 17 BRPM | HEART RATE: 59 BPM | TEMPERATURE: 97 F | OXYGEN SATURATION: 98 % | HEIGHT: 72 IN | WEIGHT: 167.88 LBS

## 2024-11-06 DIAGNOSIS — J02.9 SORE THROAT: ICD-10-CM

## 2024-11-06 DIAGNOSIS — Z11.3 SCREENING EXAMINATION FOR STD (SEXUALLY TRANSMITTED DISEASE): ICD-10-CM

## 2024-11-06 DIAGNOSIS — J06.9 VIRAL URI: Primary | ICD-10-CM

## 2024-11-06 LAB
CTP QC/QA: YES
HAV IGM SERPL QL IA: NORMAL
HBV CORE IGM SERPL QL IA: NORMAL
HBV SURFACE AG SERPL QL IA: NORMAL
HCV AB SERPL QL IA: NORMAL
HIV 1+2 AB+HIV1 P24 AG SERPL QL IA: NORMAL
SARS-COV-2 AG RESP QL IA.RAPID: NEGATIVE
TREPONEMA PALLIDUM IGG+IGM AB [PRESENCE] IN SERUM OR PLASMA BY IMMUNOASSAY: NONREACTIVE

## 2024-11-06 PROCEDURE — 87661 TRICHOMONAS VAGINALIS AMPLIF: CPT | Performed by: PHYSICIAN ASSISTANT

## 2024-11-06 PROCEDURE — 86593 SYPHILIS TEST NON-TREP QUANT: CPT | Performed by: PHYSICIAN ASSISTANT

## 2024-11-06 PROCEDURE — 87491 CHLMYD TRACH DNA AMP PROBE: CPT | Performed by: PHYSICIAN ASSISTANT

## 2024-11-06 PROCEDURE — 87811 SARS-COV-2 COVID19 W/OPTIC: CPT | Mod: QW,S$GLB,, | Performed by: PHYSICIAN ASSISTANT

## 2024-11-06 PROCEDURE — 99213 OFFICE O/P EST LOW 20 MIN: CPT | Mod: S$GLB,,, | Performed by: PHYSICIAN ASSISTANT

## 2024-11-06 PROCEDURE — 87389 HIV-1 AG W/HIV-1&-2 AB AG IA: CPT | Performed by: PHYSICIAN ASSISTANT

## 2024-11-06 PROCEDURE — 80074 ACUTE HEPATITIS PANEL: CPT | Performed by: PHYSICIAN ASSISTANT

## 2024-11-06 NOTE — LETTER
November 6, 2024      [facility] [address] [place] [address]  Phone: [phone]  Fax: [phone]       Patient: Dameon Martinez   YOB: 2000  Date of Visit: 11/06/2024    To Whom It May Concern:    Mary Martinez  was at [facility] on 11/06/2024. The patient may return to work/school on 11/8/2024 with no restrictions. If you have any questions or concerns, or if I can be of further assistance, please do not hesitate to contact me.    Sincerely,      Víctor Munoz PA-C

## 2024-11-06 NOTE — PROGRESS NOTES
Subjective:      Patient ID: Dameon Martinez is a 23 y.o. male.    Vitals:  height is 6' (1.829 m) and weight is 76.1 kg (167 lb 14.1 oz). His temperature is 97 °F (36.1 °C). His blood pressure is 135/67 and his pulse is 59 (abnormal). His respiration is 17 and oxygen saturation is 98%.     Chief Complaint: Sore Throat    Patient presents with sore throat that started this morning. Patient has been exposed to COVID.  No other symptoms at this time.      He is also wanting to get screened for STDs today.  No known exposure.  Asymptomatic.        Sore Throat   This is a new problem. The current episode started today. The problem has been unchanged. There has been no fever. The pain is at a severity of 1/10. The patient is experiencing no pain. Pertinent negatives include no abdominal pain, congestion, coughing, diarrhea, drooling, ear discharge, ear pain, headaches, hoarse voice, plugged ear sensation, neck pain, shortness of breath, stridor, swollen glands, trouble swallowing or vomiting. He has had no exposure to strep or mono. He has tried nothing for the symptoms. The treatment provided no relief.       HENT:  Positive for sore throat. Negative for ear pain, ear discharge, drooling, congestion and trouble swallowing.    Neck: Negative for neck pain.   Respiratory:  Negative for cough, shortness of breath and stridor.    Gastrointestinal:  Negative for abdominal pain, vomiting and diarrhea.   Neurological:  Negative for headaches.      Objective:     Physical Exam   Constitutional: He is oriented to person, place, and time. He appears well-developed. He is cooperative.  Non-toxic appearance. He does not appear ill. No distress.   HENT:   Head: Normocephalic and atraumatic.   Ears:   Right Ear: Hearing, tympanic membrane, external ear and ear canal normal.   Left Ear: Hearing, tympanic membrane, external ear and ear canal normal.   Nose: Nose normal. No mucosal edema, rhinorrhea or nasal deformity. No epistaxis.  Right sinus exhibits no maxillary sinus tenderness and no frontal sinus tenderness. Left sinus exhibits no maxillary sinus tenderness and no frontal sinus tenderness.   Mouth/Throat: Uvula is midline, oropharynx is clear and moist and mucous membranes are normal. No trismus in the jaw. Normal dentition. No uvula swelling. No oropharyngeal exudate, posterior oropharyngeal edema or posterior oropharyngeal erythema.   Eyes: Conjunctivae and lids are normal. No scleral icterus.   Neck: Trachea normal and phonation normal. Neck supple. No edema present. No erythema present. No neck rigidity present.   Cardiovascular: Normal rate, regular rhythm, normal heart sounds and normal pulses.   Pulmonary/Chest: Effort normal and breath sounds normal. No respiratory distress. He has no decreased breath sounds. He has no rhonchi.   Abdominal: Normal appearance.   Musculoskeletal: Normal range of motion.         General: No deformity. Normal range of motion.   Neurological: He is alert and oriented to person, place, and time. He exhibits normal muscle tone. Coordination normal.   Skin: Skin is warm, dry, intact, not diaphoretic and not pale.   Psychiatric: His speech is normal and behavior is normal. Judgment and thought content normal.   Nursing note and vitals reviewed.      Assessment:     1. Viral URI    2. Sore throat    3. Screening examination for STD (sexually transmitted disease)        Plan:       Viral URI    Sore throat  -     SARS Coronavirus 2 Antigen, POCT Manual Read    Screening examination for STD (sexually transmitted disease)  -     HIV 1/2 Ag/Ab (4th Gen); Future; Expected date: 11/06/2024  -     Treponema Pallidium Antibodies IgG, IgM; Future; Expected date: 11/06/2024  -     HEPATITIS PANEL, ACUTE; Future; Expected date: 11/06/2024  -     C. trachomatis/N. gonorrhoeae by AMP DNA  -     Trichomonas vaginalis, RNA, Qual, Urine      Results for orders placed or performed in visit on 11/06/24   SARS Coronavirus 2  Antigen, POCT Manual Read    Collection Time: 11/06/24  3:29 PM   Result Value Ref Range    SARS Coronavirus 2 Antigen Negative Negative     Acceptable Yes      Will notify patient of results of work up for STDs.  Will treat as needed.  Advised to abstain from sexual intercourse until results available.      Advise increase p.o. fluids--at least 64 ounces of water/juice & rest    Normal saline nasal wash to irrigate sinuses and for congestion/runny nose.  Cool mist humidifier/vaporizer.  Practice good handwashing.  Mucinex for cough and chest congestion.  Tylenol or Ibuprofen for fever, headache and body aches.  Warm salt water gargles for throat comfort.  Chloraseptic spray or lozenges for throat comfort.  See PCP or go to ER if symptoms worsen or fail to improve with treatment.

## 2024-11-08 LAB
C TRACH DNA SPEC QL NAA+PROBE: NOT DETECTED
N GONORRHOEA DNA SPEC QL NAA+PROBE: NOT DETECTED
SPECIMEN SOURCE: NORMAL
T VAGINALIS RRNA SPEC QL NAA+PROBE: NEGATIVE

## 2025-01-08 ENCOUNTER — OFFICE VISIT (OUTPATIENT)
Dept: FAMILY MEDICINE | Facility: CLINIC | Age: 25
End: 2025-01-08
Payer: OTHER GOVERNMENT

## 2025-01-08 VITALS
BODY MASS INDEX: 22.21 KG/M2 | HEART RATE: 70 BPM | DIASTOLIC BLOOD PRESSURE: 64 MMHG | TEMPERATURE: 99 F | HEIGHT: 72 IN | OXYGEN SATURATION: 97 % | SYSTOLIC BLOOD PRESSURE: 130 MMHG | WEIGHT: 164 LBS

## 2025-01-08 DIAGNOSIS — S06.0X1D CONCUSSION WITH LOSS OF CONSCIOUSNESS OF 30 MINUTES OR LESS, SUBSEQUENT ENCOUNTER: ICD-10-CM

## 2025-01-08 DIAGNOSIS — S16.1XXD STRAIN OF NECK MUSCLE, SUBSEQUENT ENCOUNTER: ICD-10-CM

## 2025-01-08 DIAGNOSIS — H93.12 TINNITUS OF LEFT EAR: Primary | ICD-10-CM

## 2025-01-08 DIAGNOSIS — F41.1 GAD (GENERALIZED ANXIETY DISORDER): ICD-10-CM

## 2025-01-08 PROCEDURE — 99999 PR PBB SHADOW E&M-EST. PATIENT-LVL III: CPT | Mod: PBBFAC,,, | Performed by: FAMILY MEDICINE

## 2025-01-08 PROCEDURE — 99213 OFFICE O/P EST LOW 20 MIN: CPT | Mod: PBBFAC,PO | Performed by: FAMILY MEDICINE

## 2025-01-08 PROCEDURE — 99214 OFFICE O/P EST MOD 30 MIN: CPT | Mod: S$PBB,,, | Performed by: FAMILY MEDICINE

## 2025-01-08 PROCEDURE — G2211 COMPLEX E/M VISIT ADD ON: HCPCS | Mod: S$PBB,,, | Performed by: FAMILY MEDICINE

## 2025-01-08 RX ORDER — BUSPIRONE HYDROCHLORIDE 10 MG/1
10 TABLET ORAL 3 TIMES DAILY
COMMUNITY
Start: 2024-10-08

## 2025-01-08 RX ORDER — METHOCARBAMOL 500 MG/1
500 TABLET, FILM COATED ORAL 3 TIMES DAILY
COMMUNITY

## 2025-01-08 RX ORDER — MELOXICAM 15 MG/1
15 TABLET ORAL EVERY MORNING
COMMUNITY
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
## 2025-01-16 ENCOUNTER — PATIENT MESSAGE (OUTPATIENT)
Dept: FAMILY MEDICINE | Facility: CLINIC | Age: 25
End: 2025-01-16

## 2025-01-16 ENCOUNTER — OFFICE VISIT (OUTPATIENT)
Dept: FAMILY MEDICINE | Facility: CLINIC | Age: 25
End: 2025-01-16
Payer: OTHER GOVERNMENT

## 2025-01-16 VITALS
SYSTOLIC BLOOD PRESSURE: 122 MMHG | OXYGEN SATURATION: 96 % | HEIGHT: 72 IN | DIASTOLIC BLOOD PRESSURE: 60 MMHG | WEIGHT: 162.38 LBS | HEART RATE: 91 BPM | TEMPERATURE: 97 F | BODY MASS INDEX: 21.99 KG/M2

## 2025-01-16 DIAGNOSIS — S06.0X1D CONCUSSION WITH LOSS OF CONSCIOUSNESS OF 30 MINUTES OR LESS, SUBSEQUENT ENCOUNTER: Primary | ICD-10-CM

## 2025-01-16 DIAGNOSIS — S16.1XXD STRAIN OF NECK MUSCLE, SUBSEQUENT ENCOUNTER: ICD-10-CM

## 2025-01-16 PROCEDURE — 99999 PR PBB SHADOW E&M-EST. PATIENT-LVL IV: CPT | Mod: PBBFAC,,, | Performed by: FAMILY MEDICINE

## 2025-01-16 PROCEDURE — 99214 OFFICE O/P EST MOD 30 MIN: CPT | Mod: PBBFAC,PO | Performed by: FAMILY MEDICINE

## 2025-01-16 PROCEDURE — G2211 COMPLEX E/M VISIT ADD ON: HCPCS | Mod: S$PBB,,, | Performed by: FAMILY MEDICINE

## 2025-01-16 PROCEDURE — 99214 OFFICE O/P EST MOD 30 MIN: CPT | Mod: S$PBB,,, | Performed by: FAMILY MEDICINE
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
## 2025-01-17 ENCOUNTER — TELEPHONE (OUTPATIENT)
Dept: NEUROLOGY | Facility: CLINIC | Age: 25
End: 2025-01-17
Payer: OTHER GOVERNMENT
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
## 2025-01-17 NOTE — TELEPHONE ENCOUNTER
----- Message from Kyleigh sent at 1/17/2025 12:24 PM CST -----  Type:  Patient Returning Call    Who Called:Pt Mother   Who Left Message for Patient:Carlos,  Does the patient know what this is regarding?:appointment   Would the patient rather a call back or a response via [facility]? call  Best Call Back Number:[phone]  Additional Information:

## 2025-01-17 NOTE — TELEPHONE ENCOUNTER
Called patient's mother back. The number is not active. Called the patient and was unable to leave  due to voicemail being full.

## 2025-01-27 ENCOUNTER — OFFICE VISIT (OUTPATIENT)
Dept: FAMILY MEDICINE | Facility: CLINIC | Age: 25
End: 2025-01-27
Payer: OTHER GOVERNMENT

## 2025-01-27 ENCOUNTER — TELEPHONE (OUTPATIENT)
Dept: FAMILY MEDICINE | Facility: CLINIC | Age: 25
End: 2025-01-27
Payer: OTHER GOVERNMENT

## 2025-01-27 VITALS
SYSTOLIC BLOOD PRESSURE: 118 MMHG | RESPIRATION RATE: 16 BRPM | HEART RATE: 70 BPM | OXYGEN SATURATION: 98 % | BODY MASS INDEX: 21.48 KG/M2 | HEIGHT: 73 IN | DIASTOLIC BLOOD PRESSURE: 78 MMHG | TEMPERATURE: 97 F | WEIGHT: 162.06 LBS

## 2025-01-27 DIAGNOSIS — S16.1XXD STRAIN OF NECK MUSCLE, SUBSEQUENT ENCOUNTER: Primary | ICD-10-CM

## 2025-01-27 DIAGNOSIS — S06.0X1D CONCUSSION WITH LOSS OF CONSCIOUSNESS OF 30 MINUTES OR LESS, SUBSEQUENT ENCOUNTER: ICD-10-CM

## 2025-01-27 PROCEDURE — 99214 OFFICE O/P EST MOD 30 MIN: CPT | Mod: S$PBB,,, | Performed by: FAMILY MEDICINE

## 2025-01-27 PROCEDURE — 99214 OFFICE O/P EST MOD 30 MIN: CPT | Mod: PBBFAC,PO | Performed by: FAMILY MEDICINE

## 2025-01-27 PROCEDURE — 99999 PR PBB SHADOW E&M-EST. PATIENT-LVL IV: CPT | Mod: PBBFAC,,, | Performed by: FAMILY MEDICINE

## 2025-01-27 NOTE — TELEPHONE ENCOUNTER
Called patient and left a message per Dr. Villalta that if this appointment is for not seeing a neurologist yet that all referrals had been sent out. If it's for something else he would be glad to see him.    Kiera Chavez LPN
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
## 2025-01-28 ENCOUNTER — CLINICAL SUPPORT (OUTPATIENT)
Dept: REHABILITATION | Facility: HOSPITAL | Age: 25
End: 2025-01-28
Attending: FAMILY MEDICINE
Payer: OTHER GOVERNMENT

## 2025-01-28 DIAGNOSIS — S16.1XXD STRAIN OF NECK MUSCLE, SUBSEQUENT ENCOUNTER: ICD-10-CM

## 2025-01-28 DIAGNOSIS — M54.2 NECK PAIN: Primary | ICD-10-CM

## 2025-01-28 PROCEDURE — 97140 MANUAL THERAPY 1/> REGIONS: CPT | Mod: PN

## 2025-01-28 PROCEDURE — 97162 PT EVAL MOD COMPLEX 30 MIN: CPT | Mod: PN

## 2025-01-28 PROCEDURE — 97112 NEUROMUSCULAR REEDUCATION: CPT | Mod: PN

## 2025-01-28 PROCEDURE — 97530 THERAPEUTIC ACTIVITIES: CPT | Mod: PN
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
## (undated) DEVICE — DRAPE STERI INSTRUMENT 1018

## (undated) DEVICE — GOWN SMARTGOWN LVL4 X-LONG XL

## (undated) DEVICE — SUPPORT ULNA NERVE PROTECTOR

## (undated) DEVICE — SOL NS 1000CC

## (undated) DEVICE — SOL IRR NACL .9% 3000ML

## (undated) DEVICE — SUCTION COAGULATOR 10FR 6IN

## (undated) DEVICE — TUBING SUCTION STRAIGHT .25X20

## (undated) DEVICE — SUT LABRALTAPE 1.5X36 WHITE

## (undated) DEVICE — KIT ANTIFOG

## (undated) DEVICE — PENCIL ELECTROCAUTERY W/ HLSTR

## (undated) DEVICE — SKIN MARKER DEVON 160

## (undated) DEVICE — SEE MEDLINE ITEM 157117

## (undated) DEVICE — CANNULA TWIST IN 7MM X 7CM

## (undated) DEVICE — CANNULA ARTHRO 8.25MM X 7CM

## (undated) DEVICE — SEE MEDLINE ITEM 152622

## (undated) DEVICE — DRAPE PLASTIC U 60X72

## (undated) DEVICE — GAUZE SPONGE 4X4 12PLY

## (undated) DEVICE — CONTAINER SPECIMEN STRL 4OZ

## (undated) DEVICE — INSTRAMOD SHOULDER TRACTION

## (undated) DEVICE — SEE MEDLINE ITEM 157027

## (undated) DEVICE — NDL SUREFIRE SCORPION RC

## (undated) DEVICE — PACK FLUID CONTROL SHOULDER

## (undated) DEVICE — PAD ABD 8X10 STERILE

## (undated) DEVICE — Device

## (undated) DEVICE — BLADE SHAVER TORPEDO 4MMX13CM

## (undated) DEVICE — TOWEL OR DISP STRL BLUE 4/PK

## (undated) DEVICE — TIP SUCTION YANKAUER

## (undated) DEVICE — TAPE SURGICAL MICROFOAM 3IN

## (undated) DEVICE — COVER PROXIMA MAYO STAND

## (undated) DEVICE — APPLICATOR CHLORAPREP ORN 26ML

## (undated) DEVICE — SUT VICRYL 0 SH

## (undated) DEVICE — MANIFOLD 4 PORT

## (undated) DEVICE — SUT FIBERWIRE FIBER 2M

## (undated) DEVICE — SEE MEDLINE ITEM 152529

## (undated) DEVICE — LASSO SUTURE QUICKPASS 90DEG

## (undated) DEVICE — GLOVE SURGICAL LATEX SZ 8

## (undated) DEVICE — COVER CAMERA OPERATING ROOM

## (undated) DEVICE — TIP YANKAUERS BULB NO VENT

## (undated) DEVICE — DRESSING XEROFORM FOIL PK 1X8

## (undated) DEVICE — NDL SPINAL SPINOCAN 22GX3.5

## (undated) DEVICE — ELECTRODE BLADE INSULATED 1 IN

## (undated) DEVICE — KIT SUCTION CATH 10FR

## (undated) DEVICE — ELECTRODE COOLPULSE 90 W/HAND

## (undated) DEVICE — SUT VICRYL 3-0 27 SH

## (undated) DEVICE — SEE MEDLINE ITEM 157131

## (undated) DEVICE — TUBING PUMP ARTHROSCOPY STRL

## (undated) DEVICE — SOL 9P NACL IRR PIC IL

## (undated) DEVICE — DRAPE THREE-QUARTER 53X77IN

## (undated) DEVICE — SYR IRRIGATION BULB STER 60ML

## (undated) DEVICE — NDL SAFETY 25G X 1.5 ECLIPSE

## (undated) DEVICE — CATH URETHRAL 12FR

## (undated) DEVICE — SEE MEDLINE ITEM 152739

## (undated) DEVICE — SLEEVE LATERAL TRACTION ARM

## (undated) DEVICE — ELECTRODE REM PLYHSV RETURN 9

## (undated) DEVICE — GLOVE 7.5 PROTEXIS PI MICRO

## (undated) DEVICE — SPONGE COTTON TRAY 4X4IN

## (undated) DEVICE — MAT ROLL

## (undated) DEVICE — SUT MONOCRYL 4.0 PS2 CP496G